# Patient Record
Sex: MALE | Race: WHITE | NOT HISPANIC OR LATINO | Employment: UNEMPLOYED | URBAN - NONMETROPOLITAN AREA
[De-identification: names, ages, dates, MRNs, and addresses within clinical notes are randomized per-mention and may not be internally consistent; named-entity substitution may affect disease eponyms.]

---

## 2017-06-27 ENCOUNTER — HOSPITAL ENCOUNTER (OUTPATIENT)
Dept: CT IMAGING | Facility: HOSPITAL | Age: 32
Discharge: HOME | End: 2017-06-27
Admitting: UROLOGY

## 2017-06-27 DIAGNOSIS — R31.9 HEMATURIA SYNDROME: ICD-10-CM

## 2017-06-27 PROCEDURE — 0 IOPAMIDOL 61 % SOLUTION: Performed by: UROLOGY

## 2017-06-27 PROCEDURE — 74177 CT ABD & PELVIS W/CONTRAST: CPT

## 2017-06-27 RX ADMIN — IOPAMIDOL 95 ML: 612 INJECTION, SOLUTION INTRAVENOUS at 09:30

## 2022-09-26 ENCOUNTER — TELEPHONE (OUTPATIENT)
Dept: UROLOGY | Age: 37
End: 2022-09-26

## 2022-09-26 NOTE — TELEPHONE ENCOUNTER
Looking at referral says he had a ct, us, and labs done would recommend getting this results sent from pcp or julius done and send to us for review once obtained to know how to schedule, looking through records I do not see those results. Would go ahead and make sure he knows to get disc with imaging for when we do schedule new pt apt.

## 2022-10-12 ENCOUNTER — OFFICE VISIT (OUTPATIENT)
Dept: UROLOGY | Age: 37
End: 2022-10-12
Payer: COMMERCIAL

## 2022-10-12 VITALS
WEIGHT: 312.2 LBS | TEMPERATURE: 98.2 F | DIASTOLIC BLOOD PRESSURE: 80 MMHG | SYSTOLIC BLOOD PRESSURE: 148 MMHG | HEIGHT: 72 IN | BODY MASS INDEX: 42.29 KG/M2

## 2022-10-12 DIAGNOSIS — N50.811 PAIN IN BOTH TESTICLES: ICD-10-CM

## 2022-10-12 DIAGNOSIS — R31.0 GROSS HEMATURIA: Primary | ICD-10-CM

## 2022-10-12 DIAGNOSIS — R79.89 LOW TESTOSTERONE: ICD-10-CM

## 2022-10-12 DIAGNOSIS — Z87.442 HISTORY OF KIDNEY STONES: ICD-10-CM

## 2022-10-12 DIAGNOSIS — N50.812 PAIN IN BOTH TESTICLES: ICD-10-CM

## 2022-10-12 DIAGNOSIS — N40.1 BPH WITH OBSTRUCTION/LOWER URINARY TRACT SYMPTOMS: ICD-10-CM

## 2022-10-12 DIAGNOSIS — N13.8 BPH WITH OBSTRUCTION/LOWER URINARY TRACT SYMPTOMS: ICD-10-CM

## 2022-10-12 LAB
ALBUMIN SERPL-MCNC: 4.7 G/DL (ref 3.5–5.2)
ALP BLD-CCNC: 79 U/L (ref 40–130)
ALT SERPL-CCNC: 38 U/L (ref 5–41)
ANION GAP SERPL CALCULATED.3IONS-SCNC: 9 MMOL/L (ref 7–19)
AST SERPL-CCNC: 16 U/L (ref 5–40)
BACTERIA URINE, POC: 0
BILIRUB SERPL-MCNC: 0.5 MG/DL (ref 0.2–1.2)
BILIRUBIN URINE: 0 MG/DL
BLOOD, URINE: POSITIVE
BUN BLDV-MCNC: 12 MG/DL (ref 6–20)
CALCIUM SERPL-MCNC: 9.1 MG/DL (ref 8.6–10)
CASTS URINE, POC: 0
CHLORIDE BLD-SCNC: 100 MMOL/L (ref 98–111)
CHOLESTEROL, TOTAL: 185 MG/DL (ref 160–199)
CLARITY: CLEAR
CO2: 29 MMOL/L (ref 22–29)
COLOR: YELLOW
CREAT SERPL-MCNC: 0.8 MG/DL (ref 0.5–1.2)
CRYSTALS URINE, POC: 0
EPI CELLS URINE, POC: 0
FOLLICLE STIMULATING HORMONE: 4.7 MIU/ML
GFR AFRICAN AMERICAN: >59
GFR NON-AFRICAN AMERICAN: >60
GLUCOSE BLD-MCNC: 84 MG/DL (ref 74–109)
GLUCOSE URINE: NORMAL
HCT VFR BLD CALC: 45.2 % (ref 42–52)
HDLC SERPL-MCNC: 31 MG/DL (ref 55–121)
HEMOGLOBIN: 15.1 G/DL (ref 14–18)
KETONES, URINE: NEGATIVE
LDL CHOLESTEROL CALCULATED: 118 MG/DL
LEUKOCYTE EST, POC: NORMAL
LUTEINIZING HORMONE: 5.1 MIU/ML
MCH RBC QN AUTO: 30.9 PG (ref 27–31)
MCHC RBC AUTO-ENTMCNC: 33.4 G/DL (ref 33–37)
MCV RBC AUTO: 92.4 FL (ref 80–94)
NITRITE, URINE: NEGATIVE
PDW BLD-RTO: 13.1 % (ref 11.5–14.5)
PH UA: 6.5 (ref 4.5–8)
PLATELET # BLD: 265 K/UL (ref 130–400)
PMV BLD AUTO: 10 FL (ref 9.4–12.4)
POTASSIUM SERPL-SCNC: 4.1 MMOL/L (ref 3.5–5)
PROLACTIN: 6.73 NG/ML (ref 4.04–15.2)
PROTEIN UA: NEGATIVE
RBC # BLD: 4.89 M/UL (ref 4.7–6.1)
RBC URINE, POC: 3
SODIUM BLD-SCNC: 138 MMOL/L (ref 136–145)
SPECIFIC GRAVITY UA: 1.02 (ref 1–1.03)
TOTAL PROTEIN: 7.3 G/DL (ref 6.6–8.7)
TRIGL SERPL-MCNC: 179 MG/DL (ref 0–149)
UROBILINOGEN, URINE: NORMAL
WBC # BLD: 10.5 K/UL (ref 4.8–10.8)
WBC URINE, POC: 0
YEAST URINE, POC: 0

## 2022-10-12 PROCEDURE — 81001 URINALYSIS AUTO W/SCOPE: CPT | Performed by: NURSE PRACTITIONER

## 2022-10-12 PROCEDURE — G2212 PROLONG OUTPT/OFFICE VIS: HCPCS | Performed by: NURSE PRACTITIONER

## 2022-10-12 PROCEDURE — 99205 OFFICE O/P NEW HI 60 MIN: CPT | Performed by: NURSE PRACTITIONER

## 2022-10-12 PROCEDURE — 51798 US URINE CAPACITY MEASURE: CPT | Performed by: NURSE PRACTITIONER

## 2022-10-12 RX ORDER — OXYBUTYNIN CHLORIDE 10 MG/1
10 TABLET, EXTENDED RELEASE ORAL DAILY
Qty: 90 TABLET | Refills: 1 | Status: SHIPPED | OUTPATIENT
Start: 2022-10-12

## 2022-10-12 RX ORDER — MAGNESIUM 200 MG
1 TABLET ORAL DAILY
COMMUNITY
Start: 2022-10-10

## 2022-10-12 RX ORDER — PANTOPRAZOLE SODIUM 40 MG/1
40 TABLET, DELAYED RELEASE ORAL NIGHTLY
COMMUNITY
Start: 2022-07-28

## 2022-10-12 RX ORDER — LISINOPRIL 40 MG/1
40 TABLET ORAL NIGHTLY
COMMUNITY
Start: 2022-09-01

## 2022-10-12 RX ORDER — TRAZODONE HYDROCHLORIDE 50 MG/1
50 TABLET ORAL NIGHTLY
COMMUNITY
Start: 2022-09-21

## 2022-10-12 RX ORDER — IBUPROFEN 800 MG/1
800 TABLET ORAL EVERY 6 HOURS PRN
COMMUNITY
Start: 2022-10-07

## 2022-10-12 RX ORDER — ERGOCALCIFEROL 1.25 MG/1
50000 CAPSULE ORAL WEEKLY
COMMUNITY
Start: 2022-10-09

## 2022-10-12 RX ORDER — TAMSULOSIN HYDROCHLORIDE 0.4 MG/1
0.8 CAPSULE ORAL NIGHTLY
COMMUNITY
Start: 2022-09-22

## 2022-10-12 RX ORDER — ALLOPURINOL 100 MG/1
100 TABLET ORAL DAILY
COMMUNITY
Start: 2022-09-16

## 2022-10-12 NOTE — PROGRESS NOTES
Janet Tfaoya is a 30 y.o., male, New patient who presents today   Chief Complaint   Patient presents with    New Patient     I am here today as a new pt being seen for  urinary issues. HPI   Patient presents for evaluation of several urologic issues. The first issue is gross hematuria. Patient reports about 2 weeks ago experiencing a significant episode of gross hematuria with clots. He reports associated symptoms of pain in his suprapubic area shooting into his right testicle as well as significant dysuria testicular swelling which by description sounds like an episode of epididymoorchitis. He was treated with ciprofloxacin for 10 days as well as Pyridium and reports that his testicular swelling and pain greatly improved as well as the hematuria and dysuria. Report occasionally experiencing a pain that \"goes from his knee all the way up to his groin. This was his first episode of gross hematuria, but reports that he has apparently had microscopic hematuria for over 5 years. He has previously been evaluated in [facility] by a urologist with cystoscopy but no upper tract imaging. Cannot recall the results of the cystoscopy. He reports that the cystoscopy was very traumatic for him and he would not be able to undergo another exam without anesthesia. He has previously worked as a  for about 15 years and has been exposed to various chemicals. He has never been a smoker, but has dipped for several years. He does have a familial history of testicular cancer on his mother side but no history of prostate, bladder, kidney cancer. Is already been evaluated with renal ultrasound which did not reveal any abnormalities as well as scrotal ultrasound which did reveal a couple of moderately sized calcifications, 1 in each testicle. I did review these outside images on a disc today.     Also has some chronic lower urinary tract symptoms including incomplete emptying, frequency, intermittency, weak stream, straining, nocturia x5 or more. He has an AUA symptom score of 20/35 with a bother score of 5. He has maintained on Flomax which he reports does alleviate his symptoms mildly. Patient also reports a history of nephrolithiasis. His first stone episode occurred when he was 14years old. He reports passing at least 1 stone per year. His last stone episode was about a year ago with a very \"spiky enlarged\" stone. Patient has always passed a stone spontaneously and has never required surgery. He has never had a metabolic work-up, but is interested in pursuing this. He is currently taking vitamin D, but has only been on this medication for a month. He does have a strong family history of stones. He does not know stone composition. Patient also has low testosterone which was recently checked by his primary care provider secondary to increased fatigue. Patient also experiences weakness, mood swings, lack of energy, some mild erectile dysfunction. His symptoms have been ongoing for some time. He is maintained on Zoloft, but reports his symptoms were present prior to starting the medication. His wife also reports some premature ejaculation. He has no issues with low libido. Testosterone from his primary care provider was low at 120. His PSA was 0.4. He is interested in pursuing therapy. REVIEW OF SYSTEMS:  Review of Systems   Constitutional:  Positive for fatigue. Negative for chills and fever. Gastrointestinal:  Negative for abdominal distention, abdominal pain, nausea and vomiting. Genitourinary:  Positive for difficulty urinating, dysuria, frequency, hematuria (gross hematuria about 2 weeks ago. saw clots.), testicular pain (bilateral) and urgency. Negative for flank pain. Musculoskeletal:  Negative for back pain and gait problem. Neurological:  Positive for weakness. Psychiatric/Behavioral:  Negative for agitation and confusion.       Past Medical History:   Diagnosis Date Hypertension     Kidney stone        History reviewed. No pertinent surgical history. Current Outpatient Medications   Medication Sig Dispense Refill    allopurinol (ZYLOPRIM) 100 MG tablet TAKE 1 TABLET BY MOUTH EVERY DAY      Cyanocobalamin (VITAMIN B-12) 1000 MCG SUBL       vitamin D (ERGOCALCIFEROL) 1.25 MG (38253 UT) CAPS capsule       ibuprofen (ADVIL;MOTRIN) 800 MG tablet       lisinopril (PRINIVIL;ZESTRIL) 40 MG tablet TAKE 1 TABLET BY MOUTH EVERY DAY      pantoprazole (PROTONIX) 40 MG tablet TAKE 1 TABLET BY MOUTH EVERY DAY      sertraline (ZOLOFT) 50 MG tablet TAKE 1 TABLET BY MOUTH EVERY DAY      tamsulosin (FLOMAX) 0.4 MG capsule TAKE 1 CAPSULE BY MOUTH TWICE A DAY      traZODone (DESYREL) 50 MG tablet TAKE 1 TABLET BY MOUTH AT BEDTIME      oxybutynin (DITROPAN XL) 10 MG extended release tablet Take 1 tablet by mouth daily 90 tablet 1     No current facility-administered medications for this visit. No Known Allergies    Social History     Socioeconomic History    Marital status:      Spouse name: None    Number of children: None    Years of education: None    Highest education level: None   Tobacco Use    Smoking status: Never    Smokeless tobacco: Never       Family History   Problem Relation Age of Onset    Cancer Paternal Uncle        PHYSICAL EXAM:  BP (!) 148/80   Temp 98.2 °F (36.8 °C)   Ht 6' (1.829 m)   Wt (!) 312 lb 3.2 oz (141.6 kg)   BMI 42.34 kg/m²   Physical Exam  Vitals and nursing note reviewed. Constitutional:       General: He is not in acute distress. Appearance: Normal appearance. He is not ill-appearing. Pulmonary:      Effort: Pulmonary effort is normal. No respiratory distress. Abdominal:      General: There is no distension. Tenderness: There is no abdominal tenderness. There is no right CVA tenderness or left CVA tenderness. Genitourinary:     Testes:         Right: Mass or swelling not present. Left: Mass or swelling not present. Prostate: Enlarged (60-60g- hard to completely feel). No nodules present. Neurological:      Mental Status: He is alert and oriented to person, place, and time. Mental status is at baseline. Psychiatric:         Mood and Affect: Mood normal.         Behavior: Behavior normal.       DATA:  Results for orders placed or performed in visit on 10/12/22   Culture, Urine    Specimen: Urine, clean catch   Result Value Ref Range    Urine Culture, Routine No growth at 36-48 hours    POCT Urinalysis Dipstick w/ Micro (Auto)   Result Value Ref Range    Color, UA Yellow     Clarity, UA Clear Clear    Glucose, Ur neg     Bilirubin Urine 0 mg/dL    Ketones, Urine Negative     Specific Gravity, UA 1.020 1.005 - 1.030    Blood, Urine Positive     pH, UA 6.5 4.5 - 8.0    Protein, UA Negative Negative    Nitrite, Urine Negative     Leukocytes, UA neg     Urobilinogen, Urine Normal     RBC Urine, POC 3     WBC Urine, POC 0     Bacteria Urine, POC 0     yeast urine, poc 0     Casts Urine, POC 0     Epi Cells Urine, POC 0     crystals urine, poc 0        ASSESSMENT/PLAN  1. Gross hematuria  Hematuria is explained to the patient as blood in the urine, gross (visible to the naked eye) or microscopic. In many patients, the condition is benign and of no consequence. However, it may be related to an underlying cause including but not limited to malignancies or infections of the urinary system, renal mass, nephrolithiasis, or BPH. Evaluation options have been discussed and explained to patient. Taking into account his medical, family, and social history, this patient falls into a high category. We will proceed with cystoscopy and CT Urogram.  Cystoscopy will be performed under anesthesia per patient request.     - CT ABDOMEN PELVIS W WO CONTRAST Additional Contrast? Radiologist Recommendation (Urogram Protocol); Future    2. BPH with obstruction/lower urinary tract symptoms  Patient with significant lower urinary tract symptoms.   He is already maintained on Flomax, but continues to complain mostly of irritative symptoms. Will initiate anticholinergic therapy and follow-up in about 3 months with bladder scan and to evaluate symptoms. ARI revealed enlarged nonsuspicious prostate  - MT Measure, post-void residual, US, non-imaging  - POCT Urinalysis Dipstick w/ Micro (Auto)  - oxybutynin (DITROPAN XL) 10 MG extended release tablet; Take 1 tablet by mouth daily  Dispense: 90 tablet; Refill: 1  - Culture, Urine    3. Pain in both testicles  Patient continues to have intermittent pain in bilateral testicles. Exam was unremarkable. - MT Measure, post-void residual, US, non-imaging  - POCT Urinalysis Dipstick w/ Micro (Auto)    4. Low testosterone  New labs drawn for testosterone work-up. Estrogen level as well as testosterone free and total continue to be pending at this time, however, all other labs within normal limits. Patient is interested in pursuing Testopel. He is uncertain whether he will be able to maintain injections at home and he is not a good candidate for topical therapy secondary to small children not house and risk of transfer. - Testosterone Free and Total Male; Future  - Follicle Stimulating Hormone; Future  - Luteinizing Hormone; Future  - Prolactin; Future  - Estrogens, total; Future  - Lipid Panel; Future  - CBC; Future  - Comprehensive Metabolic Panel; Future    5. History of kidney stones  Patient with history of nephrolithiasis. He is currently asymptomatic. We will be performing CT urogram for evaluation of gross hematuria and can assess stone burden at that time. At least 90 minutes were spent on the day of the visit reviewing the patient's past medical records/imaging, speaking face to face with the patient, and charting in the post visit period. Orders Placed This Encounter   Procedures    Culture, Urine     Order Specific Question:   Specify (ex-cath, midstream, cysto, etc)?      Answer:   clean catch    CT ABDOMEN PELVIS W WO CONTRAST Additional Contrast? Radiologist Recommendation (Urogram Protocol)     prior to next visit     Standing Status:   Future     Standing Expiration Date:   10/12/2023     Scheduling Instructions:      CT for Urogram protocol     Order Specific Question:   Additional Contrast?     Answer:   Radiologist Recommendation     Comments:   Urogram Protocol     Order Specific Question:   STAT Creatinine as needed:     Answer:   Yes    Testosterone Free and Total Male     Standing Status:   Future     Number of Occurrences:   1     Standing Expiration Date:   66/16/6448    Follicle Stimulating Hormone     Standing Status:   Future     Number of Occurrences:   1     Standing Expiration Date:   10/12/2023    Luteinizing Hormone     Standing Status:   Future     Number of Occurrences:   1     Standing Expiration Date:   10/12/2023    Prolactin     Standing Status:   Future     Number of Occurrences:   1     Standing Expiration Date:   10/12/2023    Estrogens, total     Standing Status:   Future     Number of Occurrences:   1     Standing Expiration Date:   10/12/2023    Lipid Panel     Standing Status:   Future     Number of Occurrences:   1     Standing Expiration Date:   10/12/2023    CBC     Standing Status:   Future     Number of Occurrences:   1     Standing Expiration Date:   10/12/2023    Comprehensive Metabolic Panel     Standing Status:   Future     Number of Occurrences:   1     Standing Expiration Date:   10/12/2023    POCT Urinalysis Dipstick w/ Micro (Auto)    OH Measure, post-void residual, US, non-imaging     60ml        Return in about 3 months (around 1/12/2023). An electronic signature was used to authenticate this note.     ANGEL BRIAN, APRN - CNP    All information inputted into the note by the MA to include chief complaint, past medical history, past surgical history, medications, allergies, social and family history and review of systems has been reviewed and updated as needed by me.    WAGNER Dragon/transcription disclaimer: Much of this document is electronic transcription/translation of spoken language to printed text. The electronic translation of spoken language may be erroneous or, at times, nonsensical words or phrases may be inadvertently transcribed.  Although I have reviewed the document for such errors, some may still exist.

## 2022-10-12 NOTE — H&P (VIEW-ONLY)
Nayla Rodríguez is a 30 y.o., male, New patient who presents today   Chief Complaint   Patient presents with    New Patient     I am here today as a new pt being seen for  urinary issues. HPI   Patient presents for evaluation of several urologic issues. The first issue is gross hematuria. Patient reports about 2 weeks ago experiencing a significant episode of gross hematuria with clots. He reports associated symptoms of pain in his suprapubic area shooting into his right testicle as well as significant dysuria testicular swelling which by description sounds like an episode of epididymoorchitis. He was treated with ciprofloxacin for 10 days as well as Pyridium and reports that his testicular swelling and pain greatly improved as well as the hematuria and dysuria. Report occasionally experiencing a pain that \"goes from his knee all the way up to his groin. This was his first episode of gross hematuria, but reports that he has apparently had microscopic hematuria for over 5 years. He has previously been evaluated in [facility] by a urologist with cystoscopy but no upper tract imaging. Cannot recall the results of the cystoscopy. He reports that the cystoscopy was very traumatic for him and he would not be able to undergo another exam without anesthesia. He has previously worked as a  for about 15 years and has been exposed to various chemicals. He has never been a smoker, but has dipped for several years. He does have a familial history of testicular cancer on his mother side but no history of prostate, bladder, kidney cancer. Is already been evaluated with renal ultrasound which did not reveal any abnormalities as well as scrotal ultrasound which did reveal a couple of moderately sized calcifications, 1 in each testicle. I did review these outside images on a disc today.     Also has some chronic lower urinary tract symptoms including incomplete emptying, frequency, intermittency, weak stream, straining, nocturia x5 or more. He has an AUA symptom score of 20/35 with a bother score of 5. He has maintained on Flomax which he reports does alleviate his symptoms mildly. Patient also reports a history of nephrolithiasis. His first stone episode occurred when he was 14years old. He reports passing at least 1 stone per year. His last stone episode was about a year ago with a very \"spiky enlarged\" stone. Patient has always passed a stone spontaneously and has never required surgery. He has never had a metabolic work-up, but is interested in pursuing this. He is currently taking vitamin D, but has only been on this medication for a month. He does have a strong family history of stones. He does not know stone composition. Patient also has low testosterone which was recently checked by his primary care provider secondary to increased fatigue. Patient also experiences weakness, mood swings, lack of energy, some mild erectile dysfunction. His symptoms have been ongoing for some time. He is maintained on Zoloft, but reports his symptoms were present prior to starting the medication. His wife also reports some premature ejaculation. He has no issues with low libido. Testosterone from his primary care provider was low at 120. His PSA was 0.4. He is interested in pursuing therapy. REVIEW OF SYSTEMS:  Review of Systems   Constitutional:  Positive for fatigue. Negative for chills and fever. Gastrointestinal:  Negative for abdominal distention, abdominal pain, nausea and vomiting. Genitourinary:  Positive for difficulty urinating, dysuria, frequency, hematuria (gross hematuria about 2 weeks ago. saw clots.), testicular pain (bilateral) and urgency. Negative for flank pain. Musculoskeletal:  Negative for back pain and gait problem. Neurological:  Positive for weakness. Psychiatric/Behavioral:  Negative for agitation and confusion.       Past Medical History:   Diagnosis Date Prostate: Enlarged (60-60g- hard to completely feel). No nodules present. Neurological:      Mental Status: He is alert and oriented to person, place, and time. Mental status is at baseline. Psychiatric:         Mood and Affect: Mood normal.         Behavior: Behavior normal.       DATA:  Results for orders placed or performed in visit on 10/12/22   Culture, Urine    Specimen: Urine, clean catch   Result Value Ref Range    Urine Culture, Routine No growth at 36-48 hours    POCT Urinalysis Dipstick w/ Micro (Auto)   Result Value Ref Range    Color, UA Yellow     Clarity, UA Clear Clear    Glucose, Ur neg     Bilirubin Urine 0 mg/dL    Ketones, Urine Negative     Specific Gravity, UA 1.020 1.005 - 1.030    Blood, Urine Positive     pH, UA 6.5 4.5 - 8.0    Protein, UA Negative Negative    Nitrite, Urine Negative     Leukocytes, UA neg     Urobilinogen, Urine Normal     RBC Urine, POC 3     WBC Urine, POC 0     Bacteria Urine, POC 0     yeast urine, poc 0     Casts Urine, POC 0     Epi Cells Urine, POC 0     crystals urine, poc 0        ASSESSMENT/PLAN  1. Gross hematuria  Hematuria is explained to the patient as blood in the urine, gross (visible to the naked eye) or microscopic. In many patients, the condition is benign and of no consequence. However, it may be related to an underlying cause including but not limited to malignancies or infections of the urinary system, renal mass, nephrolithiasis, or BPH. Evaluation options have been discussed and explained to patient. Taking into account his medical, family, and social history, this patient falls into a high category. We will proceed with cystoscopy and CT Urogram.  Cystoscopy will be performed under anesthesia per patient request.     - CT ABDOMEN PELVIS W WO CONTRAST Additional Contrast? Radiologist Recommendation (Urogram Protocol); Future    2. BPH with obstruction/lower urinary tract symptoms  Patient with significant lower urinary tract symptoms.   He is already maintained on Flomax, but continues to complain mostly of irritative symptoms. Will initiate anticholinergic therapy and follow-up in about 3 months with bladder scan and to evaluate symptoms. ARI revealed enlarged nonsuspicious prostate  - SD Measure, post-void residual, US, non-imaging  - POCT Urinalysis Dipstick w/ Micro (Auto)  - oxybutynin (DITROPAN XL) 10 MG extended release tablet; Take 1 tablet by mouth daily  Dispense: 90 tablet; Refill: 1  - Culture, Urine    3. Pain in both testicles  Patient continues to have intermittent pain in bilateral testicles. Exam was unremarkable. - SD Measure, post-void residual, US, non-imaging  - POCT Urinalysis Dipstick w/ Micro (Auto)    4. Low testosterone  New labs drawn for testosterone work-up. Estrogen level as well as testosterone free and total continue to be pending at this time, however, all other labs within normal limits. Patient is interested in pursuing Testopel. He is uncertain whether he will be able to maintain injections at home and he is not a good candidate for topical therapy secondary to small children not house and risk of transfer. - Testosterone Free and Total Male; Future  - Follicle Stimulating Hormone; Future  - Luteinizing Hormone; Future  - Prolactin; Future  - Estrogens, total; Future  - Lipid Panel; Future  - CBC; Future  - Comprehensive Metabolic Panel; Future    5. History of kidney stones  Patient with history of nephrolithiasis. He is currently asymptomatic. We will be performing CT urogram for evaluation of gross hematuria and can assess stone burden at that time. At least 90 minutes were spent on the day of the visit reviewing the patient's past medical records/imaging, speaking face to face with the patient, and charting in the post visit period. Orders Placed This Encounter   Procedures    Culture, Urine     Order Specific Question:   Specify (ex-cath, midstream, cysto, etc)?      Answer:   clean catch    CT ABDOMEN PELVIS W WO CONTRAST Additional Contrast? Radiologist Recommendation (Urogram Protocol)     prior to next visit     Standing Status:   Future     Standing Expiration Date:   10/12/2023     Scheduling Instructions:      CT for Urogram protocol     Order Specific Question:   Additional Contrast?     Answer:   Radiologist Recommendation     Comments:   Urogram Protocol     Order Specific Question:   STAT Creatinine as needed:     Answer:   Yes    Testosterone Free and Total Male     Standing Status:   Future     Number of Occurrences:   1     Standing Expiration Date:   79/46/5225    Follicle Stimulating Hormone     Standing Status:   Future     Number of Occurrences:   1     Standing Expiration Date:   10/12/2023    Luteinizing Hormone     Standing Status:   Future     Number of Occurrences:   1     Standing Expiration Date:   10/12/2023    Prolactin     Standing Status:   Future     Number of Occurrences:   1     Standing Expiration Date:   10/12/2023    Estrogens, total     Standing Status:   Future     Number of Occurrences:   1     Standing Expiration Date:   10/12/2023    Lipid Panel     Standing Status:   Future     Number of Occurrences:   1     Standing Expiration Date:   10/12/2023    CBC     Standing Status:   Future     Number of Occurrences:   1     Standing Expiration Date:   10/12/2023    Comprehensive Metabolic Panel     Standing Status:   Future     Number of Occurrences:   1     Standing Expiration Date:   10/12/2023    POCT Urinalysis Dipstick w/ Micro (Auto)    NH Measure, post-void residual, US, non-imaging     60ml        Return in about 3 months (around 1/12/2023). An electronic signature was used to authenticate this note.     ANGEL BRIAN, APRN - CNP    All information inputted into the note by the MA to include chief complaint, past medical history, past surgical history, medications, allergies, social and family history and review of systems has been reviewed and updated as needed by me.    WAGNER Dragon/transcription disclaimer: Much of this document is electronic transcription/translation of spoken language to printed text. The electronic translation of spoken language may be erroneous or, at times, nonsensical words or phrases may be inadvertently transcribed.  Although I have reviewed the document for such errors, some may still exist.

## 2022-10-14 LAB
SEX HORMONE BINDING GLOBULIN: 15 NMOL/L (ref 11–80)
TESTOSTERONE FREE-NONMALE: 47.1 PG/ML (ref 47–244)
TESTOSTERONE TOTAL: 171 NG/DL (ref 220–1000)
URINE CULTURE, ROUTINE: NORMAL

## 2022-10-14 ASSESSMENT — ENCOUNTER SYMPTOMS
BACK PAIN: 0
ABDOMINAL DISTENTION: 0
NAUSEA: 0
ABDOMINAL PAIN: 0
VOMITING: 0

## 2022-10-15 LAB — ESTROGEN TOTAL: 135 PG/ML (ref 56–213)

## 2022-10-17 ENCOUNTER — HOSPITAL ENCOUNTER (OUTPATIENT)
Dept: CT IMAGING | Age: 37
Discharge: HOME | End: 2022-10-17
Payer: MEDICAID

## 2022-10-17 ENCOUNTER — HOSPITAL ENCOUNTER (OUTPATIENT)
Dept: PREADMISSION TESTING | Age: 37
Discharge: HOME | End: 2022-10-21
Payer: MEDICAID

## 2022-10-17 VITALS — WEIGHT: 310 LBS | BODY MASS INDEX: 41.99 KG/M2 | HEIGHT: 72 IN

## 2022-10-17 DIAGNOSIS — R31.0 GROSS HEMATURIA: ICD-10-CM

## 2022-10-17 LAB
EKG P AXIS: 43 DEGREES
EKG P-R INTERVAL: 172 MS
EKG Q-T INTERVAL: 394 MS
EKG QRS DURATION: 94 MS
EKG QTC CALCULATION (BAZETT): 386 MS
EKG T AXIS: 20 DEGREES

## 2022-10-17 PROCEDURE — 93010 ELECTROCARDIOGRAM REPORT: CPT | Performed by: INTERNAL MEDICINE

## 2022-10-17 PROCEDURE — 74178 CT ABD&PLV WO CNTR FLWD CNTR: CPT

## 2022-10-17 PROCEDURE — 6360000004 HC RX CONTRAST MEDICATION: Performed by: NURSE PRACTITIONER

## 2022-10-17 PROCEDURE — 93005 ELECTROCARDIOGRAM TRACING: CPT | Performed by: ANESTHESIOLOGY

## 2022-10-17 RX ORDER — LEVOFLOXACIN 5 MG/ML
500 INJECTION, SOLUTION INTRAVENOUS ONCE
Status: CANCELLED | OUTPATIENT
Start: 2022-10-18

## 2022-10-17 RX ADMIN — IOPAMIDOL 75 ML: 755 INJECTION, SOLUTION INTRAVENOUS at 08:58

## 2022-10-17 NOTE — DISCHARGE INSTRUCTIONS
PREOPERATIVE GUIDELINES WHEN RECEIVING ANESTHESIA    Do not eat or drink anything after midnight, the night before your surgery. This is extremely important for your safety. Take a bath (or shower) the night before your surgery and you may brush your teeth the morning of your surgery. You will be scheduled to arrive at the hospital 2 hours before your surgery, or follow your surgeon's instructions. Dress comfortably. Wear loose clothing that will be easy to remove and comfortable for your trip home. You may wear eyeglasses or contacts but bring your cases with you as they must be remove before your surgery. Hearing aids and dentures will need to be removed before your surgery. Do not wear any jewelry, including body jewelry. All jewelry will need to be removed prior to your surgery. Do not wear fingernail polish or make-up. It is best not to bring any valuables with you. If you are to stay in the hospital overnight, bring your robe, slippers and personal toiletries that you may need. POSTOPERATIVE GUIDELINES AFTER RECEIVING ANESTHESIA    If you are to go home after your surgery, you will need a responsible adult to drive you home. You will not be able to take public transportation after your discharge from the Operative Care Unit unless you are accompanied by a        responsible adult. On returning home, be sure to follow your physician's orders regarding diet, activity and medications. Remember, surgery with general anesthesia or sedation may leave you sleepy, very tired and with a decreased appetite for 12 to 24 hours. If you develop any post-surgical complications or problems, call your surgeon or [facility] Emergency Department ([phone]). The day before your surgery, you will receive a phone call from the surgery nurse, to let you know what time to arrive on the day of surgery.   This call will usually be between 2-4 PM.  If you do not receive a phone call by 4 PM the day before your surgery, please call [phone] and let them know you have not received an arrival time. If your surgery is on Monday, your call will be on the Friday before your Monday surgery. MEDICATION INSTRUCTIONS PRIOR TO YOUR SURGERY      The morning of surgery:  do not take lisinopril    You can take all your usual prescribed medications with a small sip of water. DO NOT TAKE ANY DIABETIC MEDICATIONS the morning of your surgery. DO NOT TAKE ANY SUPPLEMENTS or over the counter medications the morning of  surgery. [address] [place] [address] Surgery Patients-Revised 6-    Visitors for surgery patients are essential for the patient's emotional well-being and care       post operatively. 2.   Visitor Expectations and Limitations      3. One visitor allowed with patients in the preop/postop rooms. 4.  A second visitor may sit in the waiting area. 5.  No children under 13 allowed in the pre-post op areas unless they are the patient. 6.  Two people may be with an underage surgical/procedural patient in preop/postop        room. 7.  If you are admitted to the hospital post operatively, there are NO RESTRICTIONS on       the floor at this time. 8.  If you are admitted to ICU postoperatively, you may have one visitor in the room from        7A-7P. A second visitor may sit in the ICU waiting room.   There can be no overnight

## 2022-10-18 ENCOUNTER — ANESTHESIA (OUTPATIENT)
Dept: OPERATING ROOM | Age: 37
End: 2022-10-18
Payer: MEDICAID

## 2022-10-18 ENCOUNTER — ANESTHESIA EVENT (OUTPATIENT)
Dept: OPERATING ROOM | Age: 37
End: 2022-10-18
Payer: MEDICAID

## 2022-10-18 ENCOUNTER — APPOINTMENT (OUTPATIENT)
Dept: GENERAL RADIOLOGY | Age: 37
End: 2022-10-18
Attending: UROLOGY
Payer: MEDICAID

## 2022-10-18 ENCOUNTER — HOSPITAL ENCOUNTER (OUTPATIENT)
Age: 37
Setting detail: OUTPATIENT SURGERY
Discharge: HOME | End: 2022-10-18
Attending: UROLOGY | Admitting: UROLOGY
Payer: MEDICAID

## 2022-10-18 VITALS
HEIGHT: 72 IN | HEART RATE: 64 BPM | BODY MASS INDEX: 41.99 KG/M2 | OXYGEN SATURATION: 97 % | TEMPERATURE: 97.2 F | RESPIRATION RATE: 16 BRPM | DIASTOLIC BLOOD PRESSURE: 81 MMHG | WEIGHT: 310 LBS | SYSTOLIC BLOOD PRESSURE: 141 MMHG

## 2022-10-18 DIAGNOSIS — N20.0 KIDNEY STONES: ICD-10-CM

## 2022-10-18 DIAGNOSIS — N35.914 ANTERIOR URETHRAL STRICTURE: Primary | ICD-10-CM

## 2022-10-18 PROBLEM — R31.0 GROSS HEMATURIA: Status: ACTIVE | Noted: 2022-10-18

## 2022-10-18 PROCEDURE — 6370000000 HC RX 637 (ALT 250 FOR IP): Performed by: ANESTHESIOLOGY

## 2022-10-18 PROCEDURE — 7100000011 HC PHASE II RECOVERY - ADDTL 15 MIN: Performed by: UROLOGY

## 2022-10-18 PROCEDURE — 3700000001 HC ADD 15 MINUTES (ANESTHESIA): Performed by: UROLOGY

## 2022-10-18 PROCEDURE — 52281 CYSTOSCOPY AND TREATMENT: CPT | Performed by: UROLOGY

## 2022-10-18 PROCEDURE — 3209999900 FLUORO FOR SURGICAL PROCEDURES

## 2022-10-18 PROCEDURE — 6360000002 HC RX W HCPCS

## 2022-10-18 PROCEDURE — 7100000000 HC PACU RECOVERY - FIRST 15 MIN: Performed by: UROLOGY

## 2022-10-18 PROCEDURE — 7100000010 HC PHASE II RECOVERY - FIRST 15 MIN: Performed by: UROLOGY

## 2022-10-18 PROCEDURE — 6360000002 HC RX W HCPCS: Performed by: NURSE PRACTITIONER

## 2022-10-18 PROCEDURE — C1758 CATHETER, URETERAL: HCPCS | Performed by: UROLOGY

## 2022-10-18 PROCEDURE — 7100000001 HC PACU RECOVERY - ADDTL 15 MIN: Performed by: UROLOGY

## 2022-10-18 PROCEDURE — 3700000000 HC ANESTHESIA ATTENDED CARE: Performed by: UROLOGY

## 2022-10-18 PROCEDURE — 6360000002 HC RX W HCPCS: Performed by: ANESTHESIOLOGY

## 2022-10-18 PROCEDURE — C1726 CATH, BAL DIL, NON-VASCULAR: HCPCS | Performed by: UROLOGY

## 2022-10-18 PROCEDURE — C1769 GUIDE WIRE: HCPCS | Performed by: UROLOGY

## 2022-10-18 PROCEDURE — 2500000003 HC RX 250 WO HCPCS

## 2022-10-18 PROCEDURE — 2709999900 HC NON-CHARGEABLE SUPPLY: Performed by: UROLOGY

## 2022-10-18 PROCEDURE — 3600000004 HC SURGERY LEVEL 4 BASE: Performed by: UROLOGY

## 2022-10-18 PROCEDURE — 2580000003 HC RX 258: Performed by: ANESTHESIOLOGY

## 2022-10-18 PROCEDURE — 3600000014 HC SURGERY LEVEL 4 ADDTL 15MIN: Performed by: UROLOGY

## 2022-10-18 RX ORDER — SODIUM CHLORIDE 0.9 % (FLUSH) 0.9 %
5-40 SYRINGE (ML) INJECTION PRN
Status: DISCONTINUED | OUTPATIENT
Start: 2022-10-18 | End: 2022-10-18 | Stop reason: HOSPADM

## 2022-10-18 RX ORDER — FAMOTIDINE 20 MG/1
20 TABLET, FILM COATED ORAL ONCE
Status: COMPLETED | OUTPATIENT
Start: 2022-10-18 | End: 2022-10-18

## 2022-10-18 RX ORDER — SODIUM CHLORIDE 9 MG/ML
INJECTION, SOLUTION INTRAVENOUS PRN
Status: DISCONTINUED | OUTPATIENT
Start: 2022-10-18 | End: 2022-10-18 | Stop reason: HOSPADM

## 2022-10-18 RX ORDER — FENTANYL CITRATE 50 UG/ML
25 INJECTION, SOLUTION INTRAMUSCULAR; INTRAVENOUS EVERY 5 MIN PRN
Status: DISCONTINUED | OUTPATIENT
Start: 2022-10-18 | End: 2022-10-18

## 2022-10-18 RX ORDER — MIDAZOLAM HYDROCHLORIDE 1 MG/ML
INJECTION INTRAMUSCULAR; INTRAVENOUS PRN
Status: DISCONTINUED | OUTPATIENT
Start: 2022-10-18 | End: 2022-10-18 | Stop reason: SDUPTHER

## 2022-10-18 RX ORDER — FENTANYL CITRATE 50 UG/ML
25 INJECTION, SOLUTION INTRAMUSCULAR; INTRAVENOUS
Status: DISCONTINUED | OUTPATIENT
Start: 2022-10-18 | End: 2022-10-18 | Stop reason: HOSPADM

## 2022-10-18 RX ORDER — DEXAMETHASONE 4 MG/1
4 TABLET ORAL ONCE
Status: COMPLETED | OUTPATIENT
Start: 2022-10-18 | End: 2022-10-18

## 2022-10-18 RX ORDER — EPHEDRINE SULFATE 50 MG/ML
INJECTION, SOLUTION INTRAVENOUS PRN
Status: DISCONTINUED | OUTPATIENT
Start: 2022-10-18 | End: 2022-10-18 | Stop reason: SDUPTHER

## 2022-10-18 RX ORDER — LIDOCAINE HYDROCHLORIDE 10 MG/ML
1 INJECTION, SOLUTION EPIDURAL; INFILTRATION; INTRACAUDAL; PERINEURAL
Status: DISCONTINUED | OUTPATIENT
Start: 2022-10-18 | End: 2022-10-18 | Stop reason: HOSPADM

## 2022-10-18 RX ORDER — LIDOCAINE HYDROCHLORIDE 10 MG/ML
INJECTION, SOLUTION EPIDURAL; INFILTRATION; INTRACAUDAL; PERINEURAL PRN
Status: DISCONTINUED | OUTPATIENT
Start: 2022-10-18 | End: 2022-10-18 | Stop reason: SDUPTHER

## 2022-10-18 RX ORDER — HYDROMORPHONE HYDROCHLORIDE 1 MG/ML
0.5 INJECTION, SOLUTION INTRAMUSCULAR; INTRAVENOUS; SUBCUTANEOUS EVERY 5 MIN PRN
Status: DISCONTINUED | OUTPATIENT
Start: 2022-10-18 | End: 2022-10-18 | Stop reason: HOSPADM

## 2022-10-18 RX ORDER — OXYCODONE HYDROCHLORIDE AND ACETAMINOPHEN 5; 325 MG/1; MG/1
2 TABLET ORAL EVERY 4 HOURS PRN
Status: DISCONTINUED | OUTPATIENT
Start: 2022-10-18 | End: 2022-10-18 | Stop reason: HOSPADM

## 2022-10-18 RX ORDER — HYDROCODONE BITARTRATE AND ACETAMINOPHEN 5; 325 MG/1; MG/1
1 TABLET ORAL EVERY 6 HOURS PRN
Qty: 12 TABLET | Refills: 0 | Status: SHIPPED | OUTPATIENT
Start: 2022-10-18 | End: 2022-10-21

## 2022-10-18 RX ORDER — FENTANYL CITRATE 50 UG/ML
INJECTION, SOLUTION INTRAMUSCULAR; INTRAVENOUS PRN
Status: DISCONTINUED | OUTPATIENT
Start: 2022-10-18 | End: 2022-10-18 | Stop reason: SDUPTHER

## 2022-10-18 RX ORDER — PHENAZOPYRIDINE HYDROCHLORIDE 100 MG/1
100 TABLET, FILM COATED ORAL 3 TIMES DAILY PRN
Qty: 21 TABLET | Refills: 1 | Status: SHIPPED | OUTPATIENT
Start: 2022-10-18 | End: 2022-10-25

## 2022-10-18 RX ORDER — PROPOFOL 10 MG/ML
INJECTION, EMULSION INTRAVENOUS PRN
Status: DISCONTINUED | OUTPATIENT
Start: 2022-10-18 | End: 2022-10-18 | Stop reason: SDUPTHER

## 2022-10-18 RX ORDER — SODIUM CHLORIDE 0.9 % (FLUSH) 0.9 %
5-40 SYRINGE (ML) INJECTION EVERY 12 HOURS SCHEDULED
Status: DISCONTINUED | OUTPATIENT
Start: 2022-10-18 | End: 2022-10-18 | Stop reason: HOSPADM

## 2022-10-18 RX ORDER — FENTANYL CITRATE 50 UG/ML
50 INJECTION, SOLUTION INTRAMUSCULAR; INTRAVENOUS EVERY 5 MIN PRN
Status: DISCONTINUED | OUTPATIENT
Start: 2022-10-18 | End: 2022-10-18

## 2022-10-18 RX ORDER — MEPERIDINE HYDROCHLORIDE 25 MG/ML
12.5 INJECTION INTRAMUSCULAR; INTRAVENOUS; SUBCUTANEOUS EVERY 5 MIN PRN
Status: DISCONTINUED | OUTPATIENT
Start: 2022-10-18 | End: 2022-10-18 | Stop reason: HOSPADM

## 2022-10-18 RX ORDER — MIDAZOLAM HYDROCHLORIDE 2 MG/2ML
2 INJECTION, SOLUTION INTRAMUSCULAR; INTRAVENOUS
Status: DISCONTINUED | OUTPATIENT
Start: 2022-10-18 | End: 2022-10-18 | Stop reason: HOSPADM

## 2022-10-18 RX ORDER — ONDANSETRON 2 MG/ML
4 INJECTION INTRAMUSCULAR; INTRAVENOUS
Status: DISCONTINUED | OUTPATIENT
Start: 2022-10-18 | End: 2022-10-18 | Stop reason: HOSPADM

## 2022-10-18 RX ORDER — HYDROMORPHONE HYDROCHLORIDE 1 MG/ML
0.5 INJECTION, SOLUTION INTRAMUSCULAR; INTRAVENOUS; SUBCUTANEOUS
Status: DISCONTINUED | OUTPATIENT
Start: 2022-10-18 | End: 2022-10-18 | Stop reason: HOSPADM

## 2022-10-18 RX ORDER — FENTANYL CITRATE 50 UG/ML
50 INJECTION, SOLUTION INTRAMUSCULAR; INTRAVENOUS
Status: DISCONTINUED | OUTPATIENT
Start: 2022-10-18 | End: 2022-10-18 | Stop reason: HOSPADM

## 2022-10-18 RX ORDER — DEXAMETHASONE SODIUM PHOSPHATE 10 MG/ML
INJECTION, SOLUTION INTRAMUSCULAR; INTRAVENOUS PRN
Status: DISCONTINUED | OUTPATIENT
Start: 2022-10-18 | End: 2022-10-18 | Stop reason: SDUPTHER

## 2022-10-18 RX ORDER — SODIUM CHLORIDE 9 MG/ML
INJECTION, SOLUTION INTRAVENOUS CONTINUOUS
Status: DISCONTINUED | OUTPATIENT
Start: 2022-10-18 | End: 2022-10-18 | Stop reason: HOSPADM

## 2022-10-18 RX ORDER — HYDROMORPHONE HYDROCHLORIDE 1 MG/ML
0.25 INJECTION, SOLUTION INTRAMUSCULAR; INTRAVENOUS; SUBCUTANEOUS EVERY 5 MIN PRN
Status: DISCONTINUED | OUTPATIENT
Start: 2022-10-18 | End: 2022-10-18 | Stop reason: HOSPADM

## 2022-10-18 RX ORDER — CIPROFLOXACIN 500 MG/1
500 TABLET, FILM COATED ORAL DAILY
Qty: 7 TABLET | Refills: 0 | Status: SHIPPED | OUTPATIENT
Start: 2022-10-18 | End: 2022-10-25

## 2022-10-18 RX ORDER — PROCHLORPERAZINE EDISYLATE 5 MG/ML
5 INJECTION INTRAMUSCULAR; INTRAVENOUS
Status: DISCONTINUED | OUTPATIENT
Start: 2022-10-18 | End: 2022-10-18 | Stop reason: HOSPADM

## 2022-10-18 RX ORDER — DIPHENHYDRAMINE HYDROCHLORIDE 50 MG/ML
12.5 INJECTION INTRAMUSCULAR; INTRAVENOUS
Status: DISCONTINUED | OUTPATIENT
Start: 2022-10-18 | End: 2022-10-18 | Stop reason: HOSPADM

## 2022-10-18 RX ORDER — LEVOFLOXACIN 5 MG/ML
500 INJECTION, SOLUTION INTRAVENOUS ONCE
Status: COMPLETED | OUTPATIENT
Start: 2022-10-18 | End: 2022-10-18

## 2022-10-18 RX ORDER — ONDANSETRON 2 MG/ML
INJECTION INTRAMUSCULAR; INTRAVENOUS PRN
Status: DISCONTINUED | OUTPATIENT
Start: 2022-10-18 | End: 2022-10-18 | Stop reason: SDUPTHER

## 2022-10-18 RX ORDER — SODIUM CHLORIDE, SODIUM LACTATE, POTASSIUM CHLORIDE, CALCIUM CHLORIDE 600; 310; 30; 20 MG/100ML; MG/100ML; MG/100ML; MG/100ML
INJECTION, SOLUTION INTRAVENOUS CONTINUOUS
Status: DISCONTINUED | OUTPATIENT
Start: 2022-10-18 | End: 2022-10-18 | Stop reason: HOSPADM

## 2022-10-18 RX ADMIN — EPHEDRINE SULFATE 10 MG: 50 INJECTION INTRAMUSCULAR; INTRAVENOUS; SUBCUTANEOUS at 10:33

## 2022-10-18 RX ADMIN — DEXAMETHASONE 4 MG: 4 TABLET ORAL at 08:39

## 2022-10-18 RX ADMIN — ONDANSETRON 4 MG: 2 INJECTION INTRAMUSCULAR; INTRAVENOUS at 10:31

## 2022-10-18 RX ADMIN — FAMOTIDINE 20 MG: 20 TABLET ORAL at 08:39

## 2022-10-18 RX ADMIN — PROPOFOL 150 MG: 10 INJECTION, EMULSION INTRAVENOUS at 10:12

## 2022-10-18 RX ADMIN — LEVOFLOXACIN 500 MG: 5 INJECTION, SOLUTION INTRAVENOUS at 10:08

## 2022-10-18 RX ADMIN — LIDOCAINE HYDROCHLORIDE 50 MG: 10 INJECTION, SOLUTION EPIDURAL; INFILTRATION; INTRACAUDAL; PERINEURAL at 10:12

## 2022-10-18 RX ADMIN — MIDAZOLAM 2 MG: 1 INJECTION INTRAMUSCULAR; INTRAVENOUS at 10:06

## 2022-10-18 RX ADMIN — EPHEDRINE SULFATE 10 MG: 50 INJECTION INTRAMUSCULAR; INTRAVENOUS; SUBCUTANEOUS at 10:39

## 2022-10-18 RX ADMIN — SODIUM CHLORIDE, POTASSIUM CHLORIDE, SODIUM LACTATE AND CALCIUM CHLORIDE: 600; 310; 30; 20 INJECTION, SOLUTION INTRAVENOUS at 08:39

## 2022-10-18 RX ADMIN — DEXAMETHASONE SODIUM PHOSPHATE 10 MG: 10 INJECTION, SOLUTION INTRAMUSCULAR; INTRAVENOUS at 10:31

## 2022-10-18 RX ADMIN — FENTANYL CITRATE 100 MCG: 50 INJECTION, SOLUTION INTRAMUSCULAR; INTRAVENOUS at 10:12

## 2022-10-18 ASSESSMENT — ENCOUNTER SYMPTOMS: SHORTNESS OF BREATH: 0

## 2022-10-18 ASSESSMENT — PAIN - FUNCTIONAL ASSESSMENT: PAIN_FUNCTIONAL_ASSESSMENT: NONE - DENIES PAIN

## 2022-10-18 ASSESSMENT — LIFESTYLE VARIABLES: SMOKING_STATUS: 0

## 2022-10-18 NOTE — DISCHARGE INSTRUCTIONS
[facility]rology, Dr Manuel Nichols    Cystoscopy / Ureteroscopy / Bladder Endoscopy Procedures Discharge Instructions      You may experience : Burning sensation when you void     A feeling of a need to go to the bathroom frequently     You may have urgent urination     Your urine may be blood tinged    These symptoms should be relieved within a few hours and days  as you increase the amounts of fluids you drink and the number of times that you empty your bladder. They may persist for 1-2 weeks if you have a stent or had a biopsy or resection. We recommended that you drink plenty of fluid a few days after surgery. If you have a ureteral stent he may have pain in your side or back related to the stent. Stents also cause bladder irritation symptoms of frequency and urgency. No strenuous activities for 1 week or  until you talk to your doctor. You may feel light headed up to 24 hours after anesthesia. You should not do the following for the next 24 hours. Drive a car, operate machinery or power tools     Drink any alcoholic drinks (not even beer or wine)     Make any important decisions, i.e., signing important papers. It is recommended that you begin with clear liquids and/or light foods. If you  Are not nauseated, progress to your normal diet. I you are unable to urinate you should call your surgeon.     Dr. Liz Khan

## 2022-10-18 NOTE — PROGRESS NOTES
CLINICAL PHARMACY NOTE: MEDS TO BEDS    Total # of Prescriptions Filled: 3   The following medications were delivered to the patient:  Ciprofloxacin 500mg  Phenazopyridine 100 mg  Hydrocodone-APAP 5-325    Additional Documentation:   Delivered Rx's to op-care. Gave Rx's to patients caretaker Daryl. Patient paid $0.00 copay.

## 2022-10-18 NOTE — ANESTHESIA POSTPROCEDURE EVALUATION
Department of Anesthesiology  Postprocedure Note    Patient: Melina Arora  MRN: [id number]  YOB: 1985  Date of evaluation: 10/18/2022      Procedure Summary     Date: 10/18/22 Room / Location: [facility]    Anesthesia Start: 1006 Anesthesia Stop:     Procedure: Cystoscopy, Uretheral Dialation, Catheter insertion Diagnosis:       Gross hematuria      (Gross hematuria [R31.0])    Surgeons: Adelita Trivedi MD Responsible Provider: MARINE Mcmillan CRNA    Anesthesia Type: general ASA Status: 3          Anesthesia Type: No value filed.     Anita Phase I: Anita Score: 5    Anita Phase II:        Anesthesia Post Evaluation    Patient location during evaluation: PACU  Patient participation: waiting for patient participation  Level of consciousness: responsive to painful stimuli  Pain score: 0  Airway patency: patent  Nausea & Vomiting: no nausea and no vomiting  Complications: no  Cardiovascular status: hemodynamically stable  Respiratory status: face mask, spontaneous ventilation and oral airway  Hydration status: euvolemic  Multimodal analgesia pain management approach

## 2022-10-18 NOTE — ANESTHESIA PRE PROCEDURE
Department of Anesthesiology  Preprocedure Note       Name:  Lord Durham   Age:  30 y.o.  :  1985                                          MRN:  [id number]         Date:  10/18/2022      Surgeon: Eb Schmidt):  Silvio Dye MD    Procedure: Procedure(s):  CYSTOSCOPY  POSSIBLE RETROGRADE PYELOGRAMS    Medications prior to admission:   Prior to Admission medications    Medication Sig Start Date End Date Taking? Authorizing Provider   allopurinol (ZYLOPRIM) 100 MG tablet Take 100 mg by mouth daily 22   Historical Provider, MD   Cyanocobalamin (VITAMIN B-12) 1000 MCG SUBL Place 1 tablet under the tongue daily 10/10/22   Historical Provider, MD   vitamin D (ERGOCALCIFEROL) 1.25 MG (63706 UT) CAPS capsule Take 50,000 Units by mouth once a week 10/9/22   Historical Provider, MD   ibuprofen (ADVIL;MOTRIN) 800 MG tablet Take 800 mg by mouth every 6 hours as needed 10/7/22   Historical Provider, MD   lisinopril (PRINIVIL;ZESTRIL) 40 MG tablet Take 40 mg by mouth nightly 22   Historical Provider, MD   pantoprazole (PROTONIX) 40 MG tablet Take 40 mg by mouth nightly 22   Historical Provider, MD   sertraline (ZOLOFT) 50 MG tablet Take 50 mg by mouth nightly 22   Historical Provider, MD   tamsulosin (FLOMAX) 0.4 MG capsule Take 0.8 mg by mouth nightly 22   Historical Provider, MD   traZODone (DESYREL) 50 MG tablet Take 50 mg by mouth nightly 22   Historical Provider, MD   oxybutynin (DITROPAN XL) 10 MG extended release tablet Take 1 tablet by mouth daily 10/12/22   MARINE Felix CNP       Current medications:    Current Facility-Administered Medications   Medication Dose Route Frequency Provider Last Rate Last Admin    levoFLOXacin (LEVAQUIN) 500 MG/100ML infusion 500 mg  500 mg IntraVENous Once MARINE Felix CNP           Allergies:  No Known Allergies    Problem List:  There is no problem list on file for this patient.       Past Medical History:        Diagnosis Date    Gross hematuria     with pain    Hypertension     Kidney stone        Past Surgical History:  History reviewed. No pertinent surgical history. Social History:    Social History     Tobacco Use    Smoking status: Never    Smokeless tobacco: Never   Substance Use Topics    Alcohol use: Not Currently                                Counseling given: Not Answered      Vital Signs (Current):   Vitals:    10/18/22 0817   BP: (!) 153/78   Pulse: 55   Resp: 20   Temp: 97.6 °F (36.4 °C)   TempSrc: Temporal   SpO2: 100%   Weight: (!) 310 lb (140.6 kg)   Height: 6' (1.829 m)                                              BP Readings from Last 3 Encounters:   10/18/22 (!) 153/78   10/12/22 (!) 148/80       NPO Status: Time of last liquid consumption: 2100                        Time of last solid consumption: 2100                        Date of last liquid consumption: 10/17/22                        Date of last solid food consumption: 10/17/22    BMI:   Wt Readings from Last 3 Encounters:   10/18/22 (!) 310 lb (140.6 kg)   10/17/22 (!) 310 lb (140.6 kg)   10/12/22 (!) 312 lb 3.2 oz (141.6 kg)     Body mass index is 42.04 kg/m².     CBC:   Lab Results   Component Value Date/Time    WBC 10.5 10/12/2022 12:12 PM    RBC 4.89 10/12/2022 12:12 PM    HGB 15.1 10/12/2022 12:12 PM    HCT 45.2 10/12/2022 12:12 PM    MCV 92.4 10/12/2022 12:12 PM    RDW 13.1 10/12/2022 12:12 PM     10/12/2022 12:12 PM       CMP:   Lab Results   Component Value Date/Time     10/12/2022 12:12 PM    K 4.1 10/12/2022 12:12 PM     10/12/2022 12:12 PM    CO2 29 10/12/2022 12:12 PM    BUN 12 10/12/2022 12:12 PM    CREATININE 0.8 10/12/2022 12:12 PM    GFRAA >59 10/12/2022 12:12 PM    LABGLOM >60 10/12/2022 12:12 PM    GLUCOSE 84 10/12/2022 12:12 PM    PROT 7.3 10/12/2022 12:12 PM    CALCIUM 9.1 10/12/2022 12:12 PM    BILITOT 0.5 10/12/2022 12:12 PM    ALKPHOS 79 10/12/2022 12:12 PM    AST 16 10/12/2022 12:12 PM    ALT 38 10/12/2022 12:12 PM       POC Tests: No results for input(s): POCGLU, POCNA, POCK, POCCL, POCBUN, POCHEMO, POCHCT in the last 72 hours. Coags: No results found for: PROTIME, INR, APTT    HCG (If Applicable): No results found for: PREGTESTUR, PREGSERUM, HCG, HCGQUANT     ABGs: No results found for: PHART, PO2ART, NEY4JBR, COE5YSW, BEART, N3QZMOXK     Type & Screen (If Applicable):  No results found for: LABABO, LABRH    Drug/Infectious Status (If Applicable):  No results found for: HIV, HEPCAB    COVID-19 Screening (If Applicable): No results found for: COVID19        Anesthesia Evaluation  Patient summary reviewed and Nursing notes reviewed no history of anesthetic complications:   Airway: Mallampati: II  TM distance: >3 FB   Neck ROM: full  Mouth opening: > = 3 FB   Dental:          Pulmonary:       (-) shortness of breath, sleep apnea and not a current smoker                           Cardiovascular:  Exercise tolerance: good (>4 METS),   (+) hypertension:,     (-) pacemaker, past MI, CAD, CABG/stent, dysrhythmias,  angina and no hyperlipidemia    ECG reviewed               Beta Blocker:  Not on Beta Blocker      ROS comment: EK BPM  Sinus rhythm  Comparison Summary: No serial comparison made  Summary: Normal ECG     Neuro/Psych:      (-) seizures and CVA           GI/Hepatic/Renal:   (+) renal disease: kidney stones, morbid obesity     (-) GERD       Endo/Other:    (+) no malignancy/cancer. (-) diabetes mellitus, blood dyscrasia, no malignancy/cancer               Abdominal:   (+) obese,           Vascular:     - DVT and PE. Other Findings:           Anesthesia Plan      general     ASA 3     (Pepcid and decadron in preop.)  Induction: intravenous. MIPS: Postoperative opioids intended and Prophylactic antiemetics administered. Anesthetic plan and risks discussed with patient.                         Yadira Willoughby DO   10/18/2022

## 2022-10-18 NOTE — OP NOTE
Brief operative Note      Patient: Bernadette De Dios  YOB: 1985  MRN: [id number]    Date of Procedure: 10/18/2022    Pre-Op Diagnosis: Gross hematuria [R31.0]    Post-Op Diagnosis: Same and anterior urethral stricture       Procedure(s):  CYSTOSCOPY; Urethral Dialation, Catheter insertion    Surgeon(s):  Tiffanie Gutierrez MD    Assistant:   * No surgical staff found *    Anesthesia: General LMA    Estimated Blood Loss (mL): 0    Complications: None    Specimens:   * No specimens in log *    Implants:  * No implants in log *      Drains:   Urinary Catheter 10/18/22 2 Way (Active)       Findings: Mid pendulous urethral stricture dilated from 18-24 Vasquez Bustle with Yessica dilators 21 [place] Izquierdo with 10 cc in balloon    Detailed Description of Procedure:   See dictated report: [id number]    Disposition to PACU then to op care outpatient home with Izquierdo catheter follow-up in approximately 5 days for catheter removal.  And follow-up to see APRN in 1 month.     Electronically signed by Agatha Daniel MD on 10/18/2022 at 10:47 AM

## 2022-10-18 NOTE — INTERVAL H&P NOTE
Update History & Physical    The patient's History and Physical of October 12, 2022 was reviewed with the patient and I examined the patient. There was no change. The surgical site was confirmed by the patient and me. Pt had CT Urogram 10/17/2022 final reading is pending. On my review normal kidneys, no stones no hydro, no masses. Delay imagine shows normal collecting system and ureters. Normal CT Urogram.     Plan: The risks, benefits, expected outcome, and alternative to the recommended procedure have been discussed with the patient. Patient understands and wants to proceed with the procedure.      Electronically signed by Joanna Holcomb MD on 10/18/2022 at 8:42 AM

## 2022-10-19 ENCOUNTER — APPOINTMENT (OUTPATIENT)
Dept: CT IMAGING | Age: 37
End: 2022-10-19
Payer: MEDICAID

## 2022-10-19 ENCOUNTER — HOSPITAL ENCOUNTER (EMERGENCY)
Age: 37
Discharge: HOME | End: 2022-10-20
Payer: MEDICAID

## 2022-10-19 DIAGNOSIS — Z76.89 ENCOUNTER FOR ASSESSMENT OF FOLEY CATHETER: ICD-10-CM

## 2022-10-19 DIAGNOSIS — K59.00 CONSTIPATION, UNSPECIFIED CONSTIPATION TYPE: Primary | ICD-10-CM

## 2022-10-19 LAB
ALBUMIN SERPL-MCNC: 4.3 G/DL (ref 3.5–5.2)
ALP BLD-CCNC: 64 U/L (ref 40–130)
ALT SERPL-CCNC: 32 U/L (ref 5–41)
ANION GAP SERPL CALCULATED.3IONS-SCNC: 11 MMOL/L (ref 7–19)
AST SERPL-CCNC: 13 U/L (ref 5–40)
BACTERIA: NEGATIVE /HPF
BASOPHILS ABSOLUTE: 0.1 K/UL (ref 0–0.2)
BASOPHILS RELATIVE PERCENT: 0.3 % (ref 0–1)
BILIRUB SERPL-MCNC: 0.5 MG/DL (ref 0.2–1.2)
BILIRUBIN URINE: ABNORMAL
BLOOD, URINE: ABNORMAL
BUN BLDV-MCNC: 15 MG/DL (ref 6–20)
CALCIUM SERPL-MCNC: 8.9 MG/DL (ref 8.6–10)
CHLORIDE BLD-SCNC: 101 MMOL/L (ref 98–111)
CLARITY: CLEAR
CO2: 25 MMOL/L (ref 22–29)
COLOR: ABNORMAL
CREAT SERPL-MCNC: 0.8 MG/DL (ref 0.5–1.2)
CRYSTALS, UA: ABNORMAL /HPF
EOSINOPHILS ABSOLUTE: 0.1 K/UL (ref 0–0.6)
EOSINOPHILS RELATIVE PERCENT: 0.8 % (ref 0–5)
EPITHELIAL CELLS, UA: 0 /HPF (ref 0–5)
GFR SERPL CREATININE-BSD FRML MDRD: >60 ML/MIN/{1.73_M2}
GLUCOSE BLD-MCNC: 98 MG/DL (ref 74–109)
GLUCOSE URINE: NEGATIVE MG/DL
HCT VFR BLD CALC: 43.3 % (ref 42–52)
HEMOGLOBIN: 14.7 G/DL (ref 14–18)
HYALINE CASTS: 1 /HPF (ref 0–8)
IMMATURE GRANULOCYTES #: 0.1 K/UL
KETONES, URINE: NEGATIVE MG/DL
LACTIC ACID: 1.1 MMOL/L (ref 0.5–1.9)
LEUKOCYTE ESTERASE, URINE: ABNORMAL
LIPASE: 14 U/L (ref 13–60)
LYMPHOCYTES ABSOLUTE: 3.3 K/UL (ref 1.1–4.5)
LYMPHOCYTES RELATIVE PERCENT: 22.5 % (ref 20–40)
MCH RBC QN AUTO: 30.8 PG (ref 27–31)
MCHC RBC AUTO-ENTMCNC: 33.9 G/DL (ref 33–37)
MCV RBC AUTO: 90.8 FL (ref 80–94)
MONOCYTES ABSOLUTE: 0.8 K/UL (ref 0–0.9)
MONOCYTES RELATIVE PERCENT: 5.3 % (ref 0–10)
NEUTROPHILS ABSOLUTE: 10.2 K/UL (ref 1.5–7.5)
NEUTROPHILS RELATIVE PERCENT: 70.5 % (ref 50–65)
NITRITE, URINE: POSITIVE
PDW BLD-RTO: 13.2 % (ref 11.5–14.5)
PH UA: 5 (ref 5–8)
PLATELET # BLD: 273 K/UL (ref 130–400)
PMV BLD AUTO: 9.4 FL (ref 9.4–12.4)
POTASSIUM SERPL-SCNC: 3.9 MMOL/L (ref 3.5–5)
PROCALCITONIN: 0.04 NG/ML (ref 0–0.09)
PROTEIN UA: 30 MG/DL
RBC # BLD: 4.77 M/UL (ref 4.7–6.1)
RBC UA: 7 /HPF (ref 0–4)
SODIUM BLD-SCNC: 137 MMOL/L (ref 136–145)
SPECIFIC GRAVITY UA: 1.03 (ref 1–1.03)
TOTAL PROTEIN: 7.1 G/DL (ref 6.6–8.7)
UROBILINOGEN, URINE: 1 E.U./DL
WBC # BLD: 14.5 K/UL (ref 4.8–10.8)
WBC UA: 1 /HPF (ref 0–5)

## 2022-10-19 PROCEDURE — 74177 CT ABD & PELVIS W/CONTRAST: CPT

## 2022-10-19 PROCEDURE — 99285 EMERGENCY DEPT VISIT HI MDM: CPT

## 2022-10-19 PROCEDURE — 2580000003 HC RX 258: Performed by: PHYSICIAN ASSISTANT

## 2022-10-19 PROCEDURE — 85025 COMPLETE CBC W/AUTO DIFF WBC: CPT

## 2022-10-19 PROCEDURE — 80053 COMPREHEN METABOLIC PANEL: CPT

## 2022-10-19 PROCEDURE — 36415 COLL VENOUS BLD VENIPUNCTURE: CPT

## 2022-10-19 PROCEDURE — 96374 THER/PROPH/DIAG INJ IV PUSH: CPT

## 2022-10-19 PROCEDURE — 6360000004 HC RX CONTRAST MEDICATION: Performed by: PHYSICIAN ASSISTANT

## 2022-10-19 PROCEDURE — 96375 TX/PRO/DX INJ NEW DRUG ADDON: CPT

## 2022-10-19 PROCEDURE — 84145 PROCALCITONIN (PCT): CPT

## 2022-10-19 PROCEDURE — 83605 ASSAY OF LACTIC ACID: CPT

## 2022-10-19 PROCEDURE — 83690 ASSAY OF LIPASE: CPT

## 2022-10-19 PROCEDURE — 81001 URINALYSIS AUTO W/SCOPE: CPT

## 2022-10-19 PROCEDURE — 96372 THER/PROPH/DIAG INJ SC/IM: CPT

## 2022-10-19 PROCEDURE — 74177 CT ABD & PELVIS W/CONTRAST: CPT | Performed by: RADIOLOGY

## 2022-10-19 PROCEDURE — 6360000002 HC RX W HCPCS: Performed by: PHYSICIAN ASSISTANT

## 2022-10-19 RX ORDER — DOCUSATE SODIUM 100 MG/1
100 CAPSULE, LIQUID FILLED ORAL 2 TIMES DAILY
Qty: 12 CAPSULE | Refills: 0 | Status: SHIPPED | OUTPATIENT
Start: 2022-10-19

## 2022-10-19 RX ORDER — ONDANSETRON 4 MG/1
4 TABLET, ORALLY DISINTEGRATING ORAL EVERY 8 HOURS PRN
Qty: 12 TABLET | Refills: 0 | Status: SHIPPED | OUTPATIENT
Start: 2022-10-19

## 2022-10-19 RX ORDER — MAGNESIUM CARB/ALUMINUM HYDROX 105-160MG
30 TABLET,CHEWABLE ORAL ONCE
Status: COMPLETED | OUTPATIENT
Start: 2022-10-19 | End: 2022-10-20

## 2022-10-19 RX ORDER — ONDANSETRON 2 MG/ML
4 INJECTION INTRAMUSCULAR; INTRAVENOUS ONCE
Status: COMPLETED | OUTPATIENT
Start: 2022-10-19 | End: 2022-10-19

## 2022-10-19 RX ORDER — 0.9 % SODIUM CHLORIDE 0.9 %
1000 INTRAVENOUS SOLUTION INTRAVENOUS ONCE
Status: COMPLETED | OUTPATIENT
Start: 2022-10-19 | End: 2022-10-20

## 2022-10-19 RX ORDER — HYDROMORPHONE HYDROCHLORIDE 1 MG/ML
1 INJECTION, SOLUTION INTRAMUSCULAR; INTRAVENOUS; SUBCUTANEOUS ONCE
Status: COMPLETED | OUTPATIENT
Start: 2022-10-19 | End: 2022-10-19

## 2022-10-19 RX ADMIN — SODIUM CHLORIDE 1000 ML: 9 INJECTION, SOLUTION INTRAVENOUS at 21:16

## 2022-10-19 RX ADMIN — IOPAMIDOL 70 ML: 755 INJECTION, SOLUTION INTRAVENOUS at 21:18

## 2022-10-19 RX ADMIN — HYDROMORPHONE HYDROCHLORIDE 1 MG: 1 INJECTION, SOLUTION INTRAMUSCULAR; INTRAVENOUS; SUBCUTANEOUS at 21:11

## 2022-10-19 RX ADMIN — ONDANSETRON 4 MG: 2 INJECTION INTRAMUSCULAR; INTRAVENOUS at 21:13

## 2022-10-19 ASSESSMENT — ENCOUNTER SYMPTOMS
ABDOMINAL PAIN: 1
NAUSEA: 1
RESPIRATORY NEGATIVE: 1
EYES NEGATIVE: 1
VOMITING: 0

## 2022-10-19 ASSESSMENT — PAIN SCALES - GENERAL: PAINLEVEL_OUTOF10: 8

## 2022-10-19 NOTE — OP NOTE
JOCELIN [employer] [facility] TABATHA Summers [address], [address] [facility]                                OPERATIVE REPORT    PATIENT NAME: Sue Guerrier                       :        1985  MED REC NO:   [id number]                              ROOM:  ACCOUNT NO:   [de-identified]                           ADMIT DATE: 10/18/2022  PROVIDER:     Miriam Padron MD    DATE OF PROCEDURE:  10/18/2022    TITLE OF OPERATION:  1. Flexible cystoscopy. 2.  Urethral dilation with dilation of urethral stricture. 3.  Temporary indwelling catheter insertion, simple. PREOPERATIVE DIAGNOSIS:  Gross hematuria. POSTOPERATIVE DIAGNOSES:  1.  Gross hematuria. 2.  Anterior urethral stricture. ATTENDING SURGEON:  Miriam Padron MD    ANESTHETIC:  General LMA anesthetic. ESTIMATED BLOOD LOSS:  0 mL. HISTORY:  The patient is a 51-year-old gentleman who presented to our  office complaining of gross hematuria and lower urinary tract symptoms. Therefore, he underwent a CT urogram which shows normal kidneys  bilaterally, normal-appearing collecting system and normal kidneys with  no stones, no hydronephrosis, no filling defects, and normal collecting  system on delayed images. To complete his evaluation, he is to undergo  cystoscopy. He says he cannot tolerate that while being awake and wants  to be put to sleep. Therefore, I discussed with him that since he is  asleep and if we find any findings, that we will plan to proceed as  indicated while he is asleep his indicated procedures. The risks and  complications of the procedure have been discussed with him. He  understands and agrees to proceed. DESCRIPTION OF PROCEDURE:  The patient was brought to the operating  room, underwent general anesthetic with LMA. He was placed in the  lithotomy position. His genitalia was prepped and draped per routine  sterile fashion. He received a preoperative antibiotic and a time-out  was performed. First, I began by doing testicular examination as he was  complaining of some testicular pain. Scrotum, scrotum skin and cord  structures were palpably normal.  Testes were palpably normal  bilaterally, and the epididymis were also palpably normal.  Normal  scrotal on testicular examination. First, I began by passing the flexible cystoscope into the patient's  meatus. He had a normal-appearing circumcised phallus with normal  meatus. I then advanced the flexible cystoscope under direct vision. When I got to the mid pendulous urethra, probably at the level of the  penoscrotal junction, there was a stricture and narrowing of the  urethra. I was able to pop the flexible scope, past this area, but this  did create sort of a little flimsy flap of mucosa here. The more  proximal anterior urethra and bulbar urethra were normal without  stricture. Entering into the prostatic urethra, the prostate was normal  with no obstruction. Entered into the patient's bladder, the bladder  was inspected with normal-appearing bladder. The right and left  ureteral orifices were identified, they were in normal anatomic position  and they were effluxing clear urine. The mucosa itself was normal  without papillary, erythematous or inflammatory lesions, no ulcerations  and there were no stones or foreign bodies. There was minimal  trabeculation. No diverticulum, cellules or other abnormalities. This  was basically normal cystoscopy of the bladder. At this point, I  elected to go ahead and dilate this urethral stricture, so a Amplatz  Super Stiff guidewire was inserted under direct vision into the bladder. This was confirmed fluoroscopically to be in the bladder. I then used  Lukasz dilator beginning at 18-Bulgarian and dilated very gently and very  easily up to 24-Bulgarian. At the end of the dilation, there was still  this little flappy mucosa, so I used some cold cup biopsy forceps to  sort of pull the flap out of the lumen. Again, it was very flimsy  tissue, paper-thin little flap. I then was able to place a 20-Belizean  Izquierdo catheter without difficulty after removing the guidewire. A 10 mL  was placed in the balloon, this was hand-irrigated. This was seated  against the bladder neck in good position. We will leave the Izquierdo  catheter in place for approximately 5 days. The estimated blood loss  was 0 mL. He was awakened from his anesthetic and taken to the recovery  room in stable condition. He will follow up to see the nurse for  catheter removal in 5 days and he will follow up to see the APRN in one  month.         Sigrid Pham MD    D: 10/18/2022 11:58:33      T: 10/18/2022 15:36:53     [id number]  Job#: [id number]     Doc#: [id number]    CC:

## 2022-10-20 VITALS
RESPIRATION RATE: 18 BRPM | DIASTOLIC BLOOD PRESSURE: 54 MMHG | SYSTOLIC BLOOD PRESSURE: 116 MMHG | TEMPERATURE: 97.6 F | OXYGEN SATURATION: 94 % | HEART RATE: 57 BPM

## 2022-10-20 PROCEDURE — 6370000000 HC RX 637 (ALT 250 FOR IP): Performed by: PHYSICIAN ASSISTANT

## 2022-10-20 PROCEDURE — 6360000002 HC RX W HCPCS: Performed by: PHYSICIAN ASSISTANT

## 2022-10-20 RX ADMIN — METHYLNALTREXONE BROMIDE 12 MG: 12 INJECTION, SOLUTION SUBCUTANEOUS at 00:11

## 2022-10-20 RX ADMIN — MINERAL OIL 30 ML: 1000 SOLUTION ORAL at 00:11

## 2022-10-20 NOTE — ED PROVIDER NOTES
[facility] EMERGENCY DEPT  EMERGENCY DEPARTMENT ENCOUNTER      Pt Name: Lord Durham  MRN: [id number]  [place] 1985  Date of evaluation: 10/19/2022  Provider: Susan Andres PA-C    CHIEF COMPLAINT       Chief Complaint   Patient presents with    Abdominal Pain     Had scope yesterday by Dr. Darin Gilliam here; sent home with catheter bleeding around site per pt- pain at insertion site- pt c/o severe abd pain         HISTORY OF PRESENT ILLNESS   (Location/Symptom, Timing/Onset, Context/Setting, Quality, Duration, Modifying Factors, Severity)  Note limiting factors. HPI      Lord Durham is a 30 y.o. male who presents to the emergency department with abdominal and catheter pain. PMH significant for hypertension, history of kidney stones. Patient states yesterday he was seen by urology for urethral stretching at which time a catheter was placed. Patient notes that he has been taking Pyridium and since has been draining large amounts of orange urine into the leg bag. Patient reports he is having pain and discomfort at the tip of his penis where the catheter inserts and describes that he feels like the catheter tubing is constantly falling out, moves when he stands up or pulls on the leg bag, and he feels that he is having urinary frequency however is not \"sensing the urgency. \"  Patient states that he feels like his suprapubic abdomen as well as right testicle have had increased pain and discomfort throughout the day. Patient became concerned he was bleeding into the leg bag, developed chills, developed nausea, developed diaphoresis and presents at this time for further evaluation. Patient denies any upper abdominal discomfort, flank pain, left-sided testicular or scrotal abnormalities. Patient denies any history of catheters previously or medication use outside of the Pyridium. Patient states he is supposed to wear the catheter bag for 5 days and follow-up with urology.   Patient did later on in the evaluation that he has been at very apprehensive to have a bowel movement because he feels when he pushes down the catheter is \"going to pop out. \"  Patient states that all day today he has felt the need to have a bowel movement but has held it in due to this. Records reviewed show patient was seen yesterday in the hospital for a cystoscopy, urethral dilation, catheter insertion due to anterior urethral stricture and kidney stones performed by Dr. Chidi Bowman. Nursing Notes were reviewed. REVIEW OF SYSTEMS    (2-9 systems for level 4, 10 or more for level 5)     Review of Systems   Constitutional:  Positive for chills and diaphoresis. Negative for fever. HENT: Negative. Eyes: Negative. Respiratory: Negative. Cardiovascular: Negative. Gastrointestinal:  Positive for abdominal pain (lower) and nausea. Negative for vomiting. Genitourinary:  Positive for hematuria, penile pain (at catheter insertion site), scrotal swelling (right), testicular pain (right) and urgency. Negative for flank pain. Musculoskeletal: Negative. Negative for myalgias. Skin: Negative. Neurological: Negative. Psychiatric/Behavioral: Negative. All other systems reviewed and are negative. Except as noted above the remainder of the review of systems was reviewed and negative.        PAST MEDICAL HISTORY     Past Medical History:   Diagnosis Date    Gross hematuria     with pain    Hypertension     Kidney stone          SURGICAL HISTORY       Past Surgical History:   Procedure Laterality Date    CYSTOSCOPY N/A 10/18/2022    Cystoscopy, Uretheral Dialation, Catheter insertion performed by Aster Cobb MD at [place] [address]       Previous Medications    ALLOPURINOL (ZYLOPRIM) 100 MG TABLET    Take 100 mg by mouth daily    CIPROFLOXACIN (CIPRO) 500 MG TABLET    Take 1 tablet by mouth daily for 7 days    CYANOCOBALAMIN (VITAMIN B-12) 1000 MCG SUBL    Place 1 tablet under the tongue daily HYDROCODONE-ACETAMINOPHEN (NORCO) 5-325 MG PER TABLET    Take 1 tablet by mouth every 6 hours as needed for Pain for up to 3 days. IBUPROFEN (ADVIL;MOTRIN) 800 MG TABLET    Take 800 mg by mouth every 6 hours as needed    LISINOPRIL (PRINIVIL;ZESTRIL) 40 MG TABLET    Take 40 mg by mouth nightly    OXYBUTYNIN (DITROPAN XL) 10 MG EXTENDED RELEASE TABLET    Take 1 tablet by mouth daily    PANTOPRAZOLE (PROTONIX) 40 MG TABLET    Take 40 mg by mouth nightly    PHENAZOPYRIDINE (PYRIDIUM) 100 MG TABLET    Take 1 tablet by mouth 3 times daily as needed for Pain (for burning and spasms)    SERTRALINE (ZOLOFT) 50 MG TABLET    Take 50 mg by mouth nightly    TAMSULOSIN (FLOMAX) 0.4 MG CAPSULE    Take 0.8 mg by mouth nightly    TRAZODONE (DESYREL) 50 MG TABLET    Take 50 mg by mouth nightly    VITAMIN D (ERGOCALCIFEROL) 1.25 MG (34543 UT) CAPS CAPSULE    Take 50,000 Units by mouth once a week       ALLERGIES     Patient has no known allergies. FAMILY HISTORY       Family History   Problem Relation Age of Onset    Cancer Paternal Uncle           SOCIAL HISTORY       Social History     Socioeconomic History    Marital status:    Tobacco Use    Smoking status: Never    Smokeless tobacco: Never   Substance and Sexual Activity    Alcohol use: Not Currently       SCREENINGS         Kamran Coma Scale  Eye Opening: Spontaneous  Best Verbal Response: Oriented  Best Motor Response: Obeys commands  Kamran Coma Scale Score: 15                     CIWA Assessment  BP: (!) 129/93  Heart Rate: 79                 PHYSICAL EXAM    (up to 7 for level 4, 8 or more for level 5)     ED Triage Vitals [10/19/22 1904]   BP Temp Temp Source Heart Rate Resp SpO2 Height Weight   (!) 129/93 97.7 °F (36.5 °C) Infrared 79 21 98 % -- --       Physical Exam  Vitals and nursing note reviewed. Exam conducted with a chaperone present (Chaperone present for entire evaluation, Jeny COSTELLO).    Constitutional:       General: He is in acute distress (appears uncomfortable due to pain). Appearance: Normal appearance. He is well-developed and well-groomed. He is obese. He is not toxic-appearing or diaphoretic. HENT:      Head: Normocephalic. Mouth/Throat:      Mouth: Mucous membranes are moist.      Pharynx: Oropharynx is clear. Eyes:      Conjunctiva/sclera: Conjunctivae normal.      Pupils: Pupils are equal, round, and reactive to light. Cardiovascular:      Rate and Rhythm: Normal rate and regular rhythm. Pulmonary:      Effort: Pulmonary effort is normal.      Breath sounds: Normal breath sounds. Abdominal:      Palpations: Abdomen is soft. Tenderness: There is abdominal tenderness (suprapubic). There is no right CVA tenderness or left CVA tenderness. Genitourinary:     Penis: Normal and circumcised. No tenderness, discharge, swelling or lesions. Testes:         Right: Tenderness present. Left: Tenderness or swelling not present. Comments: Catheter tubing in place with well-appearing urethral meatus with no skin breakdown, no bleeding. Leg bag attached appropriately with orange-colored urine consistent with Pyridium use. No gross hematuria or clots of blood. Right-sided testicular pain with minimal scrotal swelling. Musculoskeletal:         General: Normal range of motion. Cervical back: Normal range of motion and neck supple. Right lower leg: No edema. Left lower leg: No edema. Skin:     General: Skin is warm. Coloration: Skin is not jaundiced or pale. Neurological:      Mental Status: He is alert and oriented to person, place, and time. Psychiatric:         Attention and Perception: Attention normal.         Mood and Affect: Mood is anxious. Affect is tearful. Speech: Speech normal.         Behavior: Behavior normal. Behavior is cooperative.        DIAGNOSTIC RESULTS         RADIOLOGY:   Non-plain film images such as CT, Ultrasound and MRI are read by the radiologist. Lia Bonilla radiographic images are visualized and preliminarily interpreted by the emergency physician with the below findings:        Interpretation per the Radiologist below, if available at the time of this note:    CT ABDOMEN PELVIS W IV CONTRAST Additional Contrast? None   Final Result   Severe constipation   No evidence of obstruction   Recommendation: Follow up as clinically indicated. All CT scans at this facility utilize dose modulation, iterative reconstruction, and/or weight based dosing when appropriate to reduce radiation dose to as low as reasonably achievable. Electronically Signed by Calli Hinojosa MD at 19-Oct-2022 11:19:00 PM EST                     LABS:  Labs Reviewed   URINALYSIS - Abnormal; Notable for the following components:       Result Value    Color, UA ORANGE (*)     Bilirubin Urine MODERATE (*)     Blood, Urine SMALL (*)     Protein, UA 30 (*)     Nitrite, Urine POSITIVE (*)     Leukocyte Esterase, Urine MODERATE (*)     All other components within normal limits   CBC WITH AUTO DIFFERENTIAL - Abnormal; Notable for the following components:    WBC 14.5 (*)     Neutrophils % 70.5 (*)     Neutrophils Absolute 10.2 (*)     All other components within normal limits   MICROSCOPIC URINALYSIS - Abnormal; Notable for the following components:    Bacteria, UA NEGATIVE (*)     Crystals, UA NEG (*)     RBC, UA 7 (*)     All other components within normal limits   COMPREHENSIVE METABOLIC PANEL   LIPASE   LACTIC ACID   PROCALCITONIN       All other labs were within normal range or not returned as of this dictation.     EMERGENCY DEPARTMENT COURSE and DIFFERENTIAL DIAGNOSIS/MDM:   Vitals:    Vitals:    10/19/22 1904   BP: (!) 129/93   Pulse: 79   Resp: 21   Temp: 97.7 °F (36.5 °C)   TempSrc: Infrared   SpO2: 98%           MDM     Amount and/or Complexity of Data Reviewed  Clinical lab tests: reviewed and ordered  Tests in the radiology section of CPT®: reviewed and ordered  Tests in the medicine section of CPT®: ordered and reviewed  Decide to obtain previous medical records or to obtain history from someone other than the patient: yes  Review and summarize past medical records: yes    Patient Progress  Patient progress: improved        REASSESSMENT        CONSULTS:  None    PROCEDURES:  Unless otherwise noted below, none     Procedures        Shandra Heredia is a 30 y.o. male who presents to the emergency department with abdominal and catheter pain. PMH significant for hypertension, history of kidney stones. Work-up revealed positive nitrates in urine consistent with Pyridium use. Moderate leukocytes however negative bacteria, 1 white blood cell, lab work otherwise unremarkable including normal renal functions. Slight leukocytosis of 14.5 with normal lactic acid, normal procalcitonin. No further acute findings on CBC, CMP, normal lipase. CT imaging of the abdomen and pelvis performed with IV contrast showed no evidence of obstruction, severe constipation. Patient did state that prior to his procedure yesterday he does not routinely or intermittently use narcotics for which he was offered Relistor and wished to proceed. Patient was also given a dose of mineral oil. Throughout evaluation patient's Izquierdo catheter drained without any difficulties. Bulb was adjusted and educated patient on ability of catheter to move slightly stating that this is normal.  Reattached leg bag to be more secure. Patient was given a dose of pain medicine and reported feeling much better. Patient was given a fluid bolus secondary to contrast.  Discussed with patient need for follow-up with urology as previously scheduled as well as follow-up with his primary care provider within the next 48 hours for close reevaluation. Discussed tricked return precautions and need for immediate return to ED should he develop any new or worsening symptoms.   Patient is appreciative, with stable vital signs, with no further questions, concerns, needs at this time and is stable for discharge. FINAL IMPRESSION      1. Constipation, unspecified constipation type    2. Encounter for assessment of Izquierdo catheter          DISPOSITION/PLAN   DISPOSITION Discharge - Pending Orders Complete 10/19/2022 10:38:42 PM      PATIENT REFERRED TO:  MARINE Dos Santos - JITENDRA  Allyson Ybarra  [phone]    Schedule an appointment as soon as possible for a visit in 2 days      Cathleen Manzano MD  [address] [address] [phone]    Schedule an appointment as soon as possible for a visit in 2 days      [facility] EMERGENCY DEPT  [facility]  [phone]    As needed    DISCHARGE MEDICATIONS:  New Prescriptions    DOCUSATE SODIUM (COLACE) 100 MG CAPSULE    Take 1 capsule by mouth 2 times daily    ONDANSETRON (ZOFRAN ODT) 4 MG DISINTEGRATING TABLET    Take 1 tablet by mouth every 8 hours as needed for Nausea or Vomiting     Controlled Substances Monitoring:     No flowsheet data found.     (Please note that portions of this note were completed with a voice recognition program.  Efforts were made to edit the dictations but occasionally words are mis-transcribed.)    Eddy Saleem PA-C (electronically signed)            Eddy Saleem PA-C  10/19/22 [address]NABEL Dhaliwal  10/19/22 [id number]

## 2022-10-20 NOTE — DISCHARGE INSTRUCTIONS
Please begin taking MiraLAX daily, use the Colace as needed. Please increase hydration with water, increase fiber in your diet. Please follow-up with your urologist as previously scheduled for catheter reevaluation on Tuesday. Please follow-up with your primary care provider within the next 48 hours for close reevaluation. Should you develop any new or worsening symptoms please return to the ER for further evaluation.

## 2022-10-25 ENCOUNTER — NURSE ONLY (OUTPATIENT)
Dept: UROLOGY | Age: 37
End: 2022-10-25

## 2022-10-25 DIAGNOSIS — N40.1 BPH WITH OBSTRUCTION/LOWER URINARY TRACT SYMPTOMS: Primary | ICD-10-CM

## 2022-10-25 DIAGNOSIS — N13.8 BPH WITH OBSTRUCTION/LOWER URINARY TRACT SYMPTOMS: Primary | ICD-10-CM

## 2022-10-26 NOTE — PROGRESS NOTES
Patient of DESMOND Mclaughlin  presents today for voiding trial post cysto in the OR. The patient denies any fever, chills or  N&V. After patient had given consent, using the catheter in place, 120cc of sterile water was installed into the bladder with no complications. Patient was able to void 150cc. MARINE Godfrey was in office at time of procedure. Patient was advised to drink clear fluids for the next couple hours and urinate. Patient was advised they may experience some blood in the urine and burning with urination for the next couple days. If the patient is unable to urinate or develops fever, chills, N&V or suprapubic pain, they will call office for an appt at clinic or seek medical treatment at nearest ER, PCP or Urgent Care if after hours. Patient verbalized understanding and all questions were answered. Patient advised to call the office with any questions or concerns. Patient is to follow up as scheduled.

## 2022-12-19 ENCOUNTER — OFFICE VISIT (OUTPATIENT)
Dept: UROLOGY | Age: 37
End: 2022-12-19
Payer: MEDICAID

## 2022-12-19 VITALS — HEIGHT: 72 IN | BODY MASS INDEX: 42.66 KG/M2 | WEIGHT: 315 LBS | TEMPERATURE: 97.5 F

## 2022-12-19 DIAGNOSIS — N35.819 OTHER STRICTURE OF URETHRA IN MALE: Primary | ICD-10-CM

## 2022-12-19 DIAGNOSIS — N13.8 BPH WITH OBSTRUCTION/LOWER URINARY TRACT SYMPTOMS: ICD-10-CM

## 2022-12-19 DIAGNOSIS — Z87.442 HISTORY OF KIDNEY STONES: ICD-10-CM

## 2022-12-19 DIAGNOSIS — E29.1 HYPOGONADISM IN MALE: ICD-10-CM

## 2022-12-19 DIAGNOSIS — R31.0 GROSS HEMATURIA: ICD-10-CM

## 2022-12-19 DIAGNOSIS — N40.1 BPH WITH OBSTRUCTION/LOWER URINARY TRACT SYMPTOMS: ICD-10-CM

## 2022-12-19 LAB
APPEARANCE FLUID: CLEAR
BILIRUBIN, POC: NORMAL
BLOOD URINE, POC: NORMAL
CLARITY, POC: CLEAR
COLOR, POC: YELLOW
GLUCOSE URINE, POC: NORMAL
KETONES, POC: NORMAL
LEUKOCYTE EST, POC: NORMAL
NITRITE, POC: NORMAL
PH, POC: 7
PROTEIN, POC: NORMAL
SPECIFIC GRAVITY, POC: 1.02
UROBILINOGEN, POC: 0.2

## 2022-12-19 PROCEDURE — S0189 TESTOSTERONE PELLET 75 MG: HCPCS | Performed by: NURSE PRACTITIONER

## 2022-12-19 PROCEDURE — 99999 PR OFFICE/OUTPT VISIT,PROCEDURE ONLY: CPT | Performed by: NURSE PRACTITIONER

## 2022-12-19 PROCEDURE — 11980 IMPLANT HORMONE PELLET(S): CPT | Performed by: NURSE PRACTITIONER

## 2022-12-19 PROCEDURE — 81002 URINALYSIS NONAUTO W/O SCOPE: CPT | Performed by: NURSE PRACTITIONER

## 2022-12-19 RX ORDER — QUETIAPINE FUMARATE 100 MG/1
TABLET, FILM COATED ORAL
COMMUNITY
Start: 2022-12-04

## 2022-12-19 RX ORDER — AMLODIPINE BESYLATE 5 MG/1
TABLET ORAL
COMMUNITY
Start: 2022-11-26

## 2022-12-19 ASSESSMENT — ENCOUNTER SYMPTOMS
ABDOMINAL PAIN: 0
ABDOMINAL DISTENTION: 0
NAUSEA: 0
BACK PAIN: 0
VOMITING: 0

## 2022-12-19 NOTE — PROGRESS NOTES
Testopel Subcutaneous hormone pellet implantation:    The patient was placed on the exam table in the supine position. The upper outer quadrant of the right hip was identified for insertion. The area was then prepped with Betadine and injected with 1% Lidocaine to anesthetize superficially and then distally along the trocar tract. A 3mm incision was made using #11 blade scalpel. The trocar with a sharp-ended stylet was inserted into the subcutaneous tissue in line with the femur. The sharp stylet was withdrawn and the Testopel pellets were placed into the well of the trocar. Testopel was advanced into the tissue using blunt-ended stylet. A total of 6 pellets were inserted. The trocar was removed and the incision was closed using Steri-strips. The wound area was covered with Steri-strips. Careful inspection of the area was done. The patient was informed of the post-procedure instructions.  He will return in 3 months to determine scheduling of the next insertion with testosterone and CBC

## 2022-12-19 NOTE — PROGRESS NOTES
Walter Parada is a 30 y.o., male, Established patient who presents today   Chief Complaint   Patient presents with    Follow-up     I am here today for my 1 month post op       HPI   Patient presents for follow-up of several urologic issues. He did complete a work-up for gross hematuria with CT urogram and cystoscopy under anesthesia. CT urogram was without abnormalities, but cystoscopy under anesthesia did reveal a mid pendulous urethral stricture which required dilation from 18-24 Petrona An and Izquierdo catheterization after surgery. Patient reports since that time, his lower urinary tract symptoms have been much improved. He was also initiated on Ditropan at his previous visit with me a couple of months ago. He complains of feeling of incomplete emptying, frequency, nocturia x2. He has an AUA symptom score of 4/35 and bother score of 0 which is dramatically improved from his previous score of 20/35 with a bother score 5. He remains maintained on Flomax as well as Ditropan. Patient also with remote history of nephrolithiasis with his first stone occurring when he was 14years old. He has passed about 1 stone per year but has never required surgery. He has a strong family history of stones but stone composition is not known. Recent CT urogram did not reveal any nephrolithiasis. Patient also with history of low testosterone initially screened by his primary care provider secondary to increased fatigue, weakness, mood swings, lack of energy, erectile dysfunction. He has been approved for Testopel and we will implant thiose pellets today. REVIEW OF SYSTEMS:  Review of Systems   Constitutional:  Negative for chills and fever. Gastrointestinal:  Negative for abdominal distention, abdominal pain, nausea and vomiting. Genitourinary:  Negative for difficulty urinating, dysuria, flank pain, frequency, hematuria and urgency. Musculoskeletal:  Negative for back pain and gait problem. Psychiatric/Behavioral:  Negative for agitation and confusion. PHYSICAL EXAM:  Temp 97.5 °F (36.4 °C) (Temporal)   Ht 6' (1.829 m)   Wt (!) 315 lb 3.2 oz (143 kg)   BMI 42.75 kg/m²   Physical Exam  Vitals and nursing note reviewed. Constitutional:       General: He is not in acute distress. Appearance: Normal appearance. He is not ill-appearing. Pulmonary:      Effort: Pulmonary effort is normal. No respiratory distress. Abdominal:      General: There is no distension. Tenderness: There is no abdominal tenderness. There is no right CVA tenderness or left CVA tenderness. Neurological:      Mental Status: He is alert and oriented to person, place, and time. Mental status is at baseline. Psychiatric:         Mood and Affect: Mood normal.         Behavior: Behavior normal.       DATA:  Results for orders placed or performed in visit on 12/19/22   POCT Urinalysis no Micro   Result Value Ref Range    Color, UA YELLOW     Clarity, UA CLEAR     Glucose, UA POC NEG     Bilirubin, UA NEG     Ketones, UA NEG     Spec Grav, UA 1.020     Blood, UA POC TRACE     pH, UA 7     Protein, UA POC NEG     Urobilinogen, UA 0.2     Leukocytes, UA NEG     Nitrite, UA NEG     Appearance, Fluid Clear Clear, Slightly Cloudy     ASSESSMENT/PLAN  1. Other stricture of urethra in male  Stricture dilated during cystoscopy. Patient doing well since removal of Izquierdo catheter. Urinary symptoms have improved. 2. BPH with obstruction/lower urinary tract symptoms  Patient continues on Flomax and oxybutynin. Patient reports symptoms have improved since surgery as well as medication.  - POCT Urinalysis no Micro    3. Hypogonadism in male  Previous testosterone work-up revealed no further abnormalities. Testopel placed today and plan to follow-up in 3 months with labs prior.  - Testosterone; Future  - CBC; Future    4.  History of kidney stones  History of stones, but recent CT urogram did not reveal any nephrolithiasis. 5. Gross hematuria  Negative work-up in October. Orders Placed This Encounter   Procedures    Testosterone     In 6 mos     Standing Status:   Future     Standing Expiration Date:   12/19/2023    CBC     Standing Status:   Future     Standing Expiration Date:   12/19/2023    POCT Urinalysis no Micro        Return in about 3 months (around 3/19/2023) for with labs prior, can be virtual visit. An electronic signature was used to authenticate this note. ANGEL BRIAN, APRN - CNP    All information inputted into the note by the MA to include chief complaint, past medical history, past surgical history, medications, allergies, social and family history and review of systems has been reviewed and updated as needed by me. EMR Dragon/transcription disclaimer: Much of this document is electronic transcription/translation of spoken language to printed text. The electronic translation of spoken language may be erroneous or, at times, nonsensical words or phrases may be inadvertently transcribed.  Although I have reviewed the document for such errors, some may still exist.

## 2023-03-17 ENCOUNTER — TELEPHONE (OUTPATIENT)
Dept: UROLOGY | Age: 38
End: 2023-03-17

## 2023-03-17 NOTE — TELEPHONE ENCOUNTER
I called to inform patient that he will need labs drawn before his next appointment on Monday March 20th, 2023. I received no answer, but left a voicemail with all information that was needed.

## 2023-03-20 ENCOUNTER — OFFICE VISIT (OUTPATIENT)
Dept: UROLOGY | Age: 38
End: 2023-03-20
Payer: MEDICAID

## 2023-03-20 VITALS — WEIGHT: 315 LBS | BODY MASS INDEX: 42.66 KG/M2 | TEMPERATURE: 98.4 F | HEIGHT: 72 IN

## 2023-03-20 DIAGNOSIS — N40.1 BPH WITH OBSTRUCTION/LOWER URINARY TRACT SYMPTOMS: Primary | ICD-10-CM

## 2023-03-20 DIAGNOSIS — N35.819 OTHER STRICTURE OF URETHRA IN MALE: ICD-10-CM

## 2023-03-20 DIAGNOSIS — Z87.442 HISTORY OF KIDNEY STONES: ICD-10-CM

## 2023-03-20 DIAGNOSIS — N13.8 BPH WITH OBSTRUCTION/LOWER URINARY TRACT SYMPTOMS: Primary | ICD-10-CM

## 2023-03-20 DIAGNOSIS — R31.0 GROSS HEMATURIA: ICD-10-CM

## 2023-03-20 DIAGNOSIS — E29.1 HYPOGONADISM IN MALE: ICD-10-CM

## 2023-03-20 LAB
BACTERIA URINE, POC: 0
BILIRUBIN URINE: 0 MG/DL
BLOOD, URINE: POSITIVE
CASTS URINE, POC: 0
CLARITY: CLEAR
COLOR: YELLOW
CRYSTALS URINE, POC: 0
EPI CELLS URINE, POC: 0
ERYTHROCYTE [DISTWIDTH] IN BLOOD BY AUTOMATED COUNT: 13.1 % (ref 11.5–14.5)
GLUCOSE URINE: NORMAL
HCT VFR BLD AUTO: 44.1 % (ref 42–52)
HGB BLD-MCNC: 14.7 G/DL (ref 14–18)
KETONES, URINE: NEGATIVE
LEUKOCYTE EST, POC: NORMAL
MCH RBC QN AUTO: 31 PG (ref 27–31)
MCHC RBC AUTO-ENTMCNC: 33.3 G/DL (ref 33–37)
MCV RBC AUTO: 93 FL (ref 80–94)
NITRITE, URINE: NEGATIVE
PH UA: 6 (ref 4.5–8)
PLATELET # BLD AUTO: 262 K/UL (ref 130–400)
PMV BLD AUTO: 10.1 FL (ref 9.4–12.4)
POST VOID RESIDUAL (PVR): 0 ML
PROTEIN UA: NEGATIVE
RBC # BLD AUTO: 4.74 M/UL (ref 4.7–6.1)
RBC URINE, POC: 2
SPECIFIC GRAVITY UA: 1.02 (ref 1–1.03)
TESTOST SERPL-MCNC: 143.2 NG/DL (ref 249–836)
UROBILINOGEN, URINE: NORMAL
WBC # BLD AUTO: 10.4 K/UL (ref 4.8–10.8)
WBC URINE, POC: 0
YEAST URINE, POC: 0

## 2023-03-20 PROCEDURE — 81001 URINALYSIS AUTO W/SCOPE: CPT | Performed by: NURSE PRACTITIONER

## 2023-03-20 PROCEDURE — 99214 OFFICE O/P EST MOD 30 MIN: CPT | Performed by: NURSE PRACTITIONER

## 2023-03-20 PROCEDURE — 51798 US URINE CAPACITY MEASURE: CPT | Performed by: NURSE PRACTITIONER

## 2023-03-20 RX ORDER — FESOTERODINE FUMARATE 8 MG/1
8 TABLET, EXTENDED RELEASE ORAL DAILY
Qty: 90 TABLET | Refills: 3 | Status: SHIPPED | OUTPATIENT
Start: 2023-03-20

## 2023-03-21 ENCOUNTER — TELEPHONE (OUTPATIENT)
Dept: UROLOGY | Age: 38
End: 2023-03-21

## 2023-03-21 RX ORDER — TAMSULOSIN HYDROCHLORIDE 0.4 MG/1
0.8 CAPSULE ORAL NIGHTLY
Qty: 60 CAPSULE | Refills: 11 | Status: SHIPPED | OUTPATIENT
Start: 2023-03-21

## 2023-03-21 RX ORDER — TAMSULOSIN HYDROCHLORIDE 0.4 MG/1
0.8 CAPSULE ORAL NIGHTLY
Qty: 30 CAPSULE | Status: CANCELLED | OUTPATIENT
Start: 2023-03-21

## 2023-03-21 NOTE — TELEPHONE ENCOUNTER
Patients wife called wanting to know if patient could get a refill on flomax. Patient does not see the provider that first started prescribing this medication anymore.

## 2023-03-23 RX ORDER — TESTOSTERONE ENANTHATE 100 MG/.5ML
100 INJECTION SUBCUTANEOUS WEEKLY
Qty: 4 ADJUSTABLE DOSE PRE-FILLED PEN SYRINGE | Refills: 0 | Status: SHIPPED | OUTPATIENT
Start: 2023-03-23

## 2023-03-23 ASSESSMENT — ENCOUNTER SYMPTOMS
VOMITING: 0
BACK PAIN: 0
NAUSEA: 0
ABDOMINAL DISTENTION: 0
ABDOMINAL PAIN: 0

## 2023-03-30 ENCOUNTER — TELEPHONE (OUTPATIENT)
Dept: UROLOGY | Age: 38
End: 2023-03-30

## 2023-03-30 NOTE — TELEPHONE ENCOUNTER
Patients wife Romaine Morgan called to cancel patients Nurse visit for today due to not receiving injections in the mail yet. She states she will call back later to r/s when they come in. Thank you!

## 2023-04-24 DIAGNOSIS — E29.1 HYPOGONADISM IN MALE: ICD-10-CM

## 2023-04-25 DIAGNOSIS — E29.1 HYPOGONADISM IN MALE: ICD-10-CM

## 2023-04-25 RX ORDER — TESTOSTERONE ENANTHATE 100 MG/.5ML
100 INJECTION SUBCUTANEOUS WEEKLY
Qty: 4 ADJUSTABLE DOSE PRE-FILLED PEN SYRINGE | Refills: 0 | OUTPATIENT
Start: 2023-04-25

## 2023-04-25 RX ORDER — TESTOSTERONE ENANTHATE 100 MG/.5ML
INJECTION SUBCUTANEOUS
Qty: 4 ADJUSTABLE DOSE PRE-FILLED PEN SYRINGE | Refills: 0 | Status: SHIPPED | OUTPATIENT
Start: 2023-04-25

## 2023-05-18 DIAGNOSIS — E29.1 HYPOGONADISM IN MALE: ICD-10-CM

## 2023-05-18 RX ORDER — TESTOSTERONE ENANTHATE 100 MG/.5ML
INJECTION SUBCUTANEOUS
Qty: 4 ADJUSTABLE DOSE PRE-FILLED PEN SYRINGE | Refills: 0 | Status: SHIPPED | OUTPATIENT
Start: 2023-05-18 | End: 2023-06-12

## 2023-06-21 ENCOUNTER — OFFICE VISIT (OUTPATIENT)
Dept: UROLOGY | Age: 38
End: 2023-06-21
Payer: MEDICAID

## 2023-06-21 VITALS — WEIGHT: 315 LBS | TEMPERATURE: 98.2 F | BODY MASS INDEX: 42.66 KG/M2 | HEIGHT: 72 IN

## 2023-06-21 DIAGNOSIS — N40.1 BPH WITH OBSTRUCTION/LOWER URINARY TRACT SYMPTOMS: ICD-10-CM

## 2023-06-21 DIAGNOSIS — N35.819 OTHER STRICTURE OF URETHRA IN MALE: ICD-10-CM

## 2023-06-21 DIAGNOSIS — R31.0 GROSS HEMATURIA: ICD-10-CM

## 2023-06-21 DIAGNOSIS — N13.8 BPH WITH OBSTRUCTION/LOWER URINARY TRACT SYMPTOMS: ICD-10-CM

## 2023-06-21 DIAGNOSIS — E29.1 HYPOGONADISM IN MALE: Primary | ICD-10-CM

## 2023-06-21 DIAGNOSIS — Z87.442 HISTORY OF KIDNEY STONES: ICD-10-CM

## 2023-06-21 LAB
APPEARANCE FLUID: CLEAR
BILIRUBIN, POC: NORMAL
BLOOD URINE, POC: NORMAL
CLARITY, POC: CLEAR
COLOR, POC: YELLOW
GLUCOSE URINE, POC: NORMAL
KETONES, POC: NORMAL
LEUKOCYTE EST, POC: NORMAL
NITRITE, POC: NORMAL
PH, POC: 7
POST VOID RESIDUAL (PVR): 12 ML
PROTEIN, POC: NORMAL
SPECIFIC GRAVITY, POC: 1.02
UROBILINOGEN, POC: 1

## 2023-06-21 PROCEDURE — 51798 US URINE CAPACITY MEASURE: CPT | Performed by: NURSE PRACTITIONER

## 2023-06-21 PROCEDURE — 81002 URINALYSIS NONAUTO W/O SCOPE: CPT | Performed by: NURSE PRACTITIONER

## 2023-06-21 PROCEDURE — 99214 OFFICE O/P EST MOD 30 MIN: CPT | Performed by: NURSE PRACTITIONER

## 2023-06-21 RX ORDER — OXYBUTYNIN CHLORIDE 10 MG/1
TABLET, EXTENDED RELEASE ORAL
COMMUNITY

## 2023-06-21 RX ORDER — FLUTICASONE PROPIONATE 50 MCG
SPRAY, SUSPENSION (ML) NASAL
COMMUNITY
Start: 2019-05-01

## 2023-06-21 ASSESSMENT — ENCOUNTER SYMPTOMS
NAUSEA: 0
BACK PAIN: 0
ABDOMINAL DISTENTION: 0
ABDOMINAL PAIN: 0
VOMITING: 0

## 2023-06-21 NOTE — PROGRESS NOTES
Bebe Rodriguez is a 41 y.o., male, Established patient who presents today   Chief Complaint   Patient presents with    Follow-up     I am here today for my 3 month follow up to discuss my testosterone. HPI   Patient is a 32-year-old male who presents to clinic today for follow-up of several urologic issues. He is followed for lower urinary tract symptoms and complains of feeling of incomplete emptying, frequency, nocturia x2. Maintained on Flomax and Toviaz. Failed oxybutynin. He comes in today for reevaluation. AUA symptom score improved now 2/35 with only complaints of nocturia x2. Denies any side effects with Toviaz. Of note he did undergo complete work-up for gross hematuria in the past with CT urogram and cystoscopy under anesthesia which ultimately revealed a mid pendulous urethral stricture requiring dilation and Izquierdo catheterization after surgery. Denies any weakened stream.  Bladder scan today is 12 mL PVR. He is also followed for low testosterone. Was previously maintained on Testopel which he did not like. At last visit he was placed on Xyosted and he does report to me today improved symptoms of fatigue. No new labs today. REVIEW OF SYSTEMS:  Review of Systems   Constitutional:  Negative for chills and fever. Gastrointestinal:  Negative for abdominal distention, abdominal pain, nausea and vomiting. Genitourinary:  Negative for difficulty urinating, dysuria, flank pain, frequency, hematuria and urgency. Musculoskeletal:  Negative for back pain and gait problem. Psychiatric/Behavioral:  Negative for agitation and confusion. PHYSICAL EXAM:  Temp 98.2 °F (36.8 °C) (Temporal)   Ht 6' (1.829 m)   Wt (!) 344 lb 9.6 oz (156.3 kg)   BMI 46.74 kg/m²   Physical Exam  Vitals and nursing note reviewed. Constitutional:       General: He is not in acute distress. Appearance: Normal appearance. He is obese. He is not ill-appearing.    Pulmonary:      Effort: Pulmonary effort

## 2023-07-05 DIAGNOSIS — E29.1 HYPOGONADISM IN MALE: ICD-10-CM

## 2023-07-05 RX ORDER — TESTOSTERONE ENANTHATE 100 MG/.5ML
INJECTION SUBCUTANEOUS
Qty: 4 ADJUSTABLE DOSE PRE-FILLED PEN SYRINGE | Refills: 0 | Status: SHIPPED | OUTPATIENT
Start: 2023-07-05

## 2023-07-07 DIAGNOSIS — E29.1 HYPOGONADISM IN MALE: ICD-10-CM

## 2023-07-10 RX ORDER — TESTOSTERONE ENANTHATE 100 MG/.5ML
INJECTION SUBCUTANEOUS
OUTPATIENT
Start: 2023-07-10

## 2023-07-31 DIAGNOSIS — E29.1 HYPOGONADISM IN MALE: ICD-10-CM

## 2023-07-31 RX ORDER — TESTOSTERONE ENANTHATE 100 MG/.5ML
INJECTION SUBCUTANEOUS
Qty: 4 ADJUSTABLE DOSE PRE-FILLED PEN SYRINGE | Refills: 0 | Status: SHIPPED | OUTPATIENT
Start: 2023-07-31

## 2023-08-19 DIAGNOSIS — E29.1 HYPOGONADISM IN MALE: ICD-10-CM

## 2023-08-21 RX ORDER — TESTOSTERONE ENANTHATE 100 MG/.5ML
INJECTION SUBCUTANEOUS
Qty: 4 ADJUSTABLE DOSE PRE-FILLED PEN SYRINGE | Refills: 0 | Status: SHIPPED | OUTPATIENT
Start: 2023-08-21

## 2023-09-14 DIAGNOSIS — E29.1 HYPOGONADISM IN MALE: ICD-10-CM

## 2023-09-15 RX ORDER — TESTOSTERONE ENANTHATE 100 MG/.5ML
INJECTION SUBCUTANEOUS
Qty: 4 ADJUSTABLE DOSE PRE-FILLED PEN SYRINGE | Refills: 0 | Status: SHIPPED | OUTPATIENT
Start: 2023-09-15 | End: 2023-09-22 | Stop reason: SDUPTHER

## 2023-09-20 ENCOUNTER — PATIENT MESSAGE (OUTPATIENT)
Dept: UROLOGY | Age: 38
End: 2023-09-20

## 2023-09-22 ENCOUNTER — TELEPHONE (OUTPATIENT)
Dept: UROLOGY | Age: 38
End: 2023-09-22

## 2023-09-22 DIAGNOSIS — E29.1 HYPOGONADISM IN MALE: ICD-10-CM

## 2023-09-22 RX ORDER — TESTOSTERONE ENANTHATE 100 MG/.5ML
100 INJECTION SUBCUTANEOUS WEEKLY
Qty: 4 ADJUSTABLE DOSE PRE-FILLED PEN SYRINGE | Refills: 0 | Status: SHIPPED | OUTPATIENT
Start: 2023-09-22

## 2023-09-22 NOTE — TELEPHONE ENCOUNTER
I called the patient and spoke with his wife. I verbalized we had sent in that script for the injectable pens to the [facility]. She verbalized understanding.

## 2023-09-22 NOTE — TELEPHONE ENCOUNTER
----- Message from MARINE Cabrera CNP sent at 9/22/2023  9:08 AM CDT -----  Regarding: RE: Med question  Contact: [phone]  Sent    ----- Message -----  From: Leah Anderson MA  Sent: 9/22/2023   7:52 AM CDT  To: MARINE Cabrera CNP  Subject: FW: Med question                                 Please advise if appropriate.   ----- Message -----  From: Radha Ashby"  Sent: 9/21/2023   6:45 PM CDT  To: [facility] Urology Clinical Staff  Subject: Med question                                     Dada Evangelista said send the script to them for the injection pens they will try and order them.

## 2023-10-11 ENCOUNTER — OFFICE VISIT (OUTPATIENT)
Dept: UROLOGY | Age: 38
End: 2023-10-11
Payer: MEDICAID

## 2023-10-11 VITALS — BODY MASS INDEX: 42.66 KG/M2 | WEIGHT: 315 LBS | HEIGHT: 72 IN | TEMPERATURE: 98.2 F

## 2023-10-11 DIAGNOSIS — N35.819 OTHER STRICTURE OF URETHRA IN MALE: ICD-10-CM

## 2023-10-11 DIAGNOSIS — R31.0 GROSS HEMATURIA: ICD-10-CM

## 2023-10-11 DIAGNOSIS — N13.8 BPH WITH OBSTRUCTION/LOWER URINARY TRACT SYMPTOMS: ICD-10-CM

## 2023-10-11 DIAGNOSIS — N40.1 BPH WITH OBSTRUCTION/LOWER URINARY TRACT SYMPTOMS: ICD-10-CM

## 2023-10-11 DIAGNOSIS — E29.1 HYPOGONADISM IN MALE: ICD-10-CM

## 2023-10-11 DIAGNOSIS — E29.1 HYPOGONADISM IN MALE: Primary | ICD-10-CM

## 2023-10-11 LAB
APPEARANCE FLUID: CLEAR
BILIRUBIN, POC: NORMAL
BLOOD URINE, POC: NORMAL
CLARITY, POC: CLEAR
COLOR, POC: YELLOW
ERYTHROCYTE [DISTWIDTH] IN BLOOD BY AUTOMATED COUNT: 14.1 % (ref 11.5–14.5)
GLUCOSE URINE, POC: NORMAL
HCT VFR BLD AUTO: 49 % (ref 42–52)
HGB BLD-MCNC: 16.1 G/DL (ref 14–18)
KETONES, POC: NORMAL
LEUKOCYTE EST, POC: NORMAL
MCH RBC QN AUTO: 30.3 PG (ref 27–31)
MCHC RBC AUTO-ENTMCNC: 32.9 G/DL (ref 33–37)
MCV RBC AUTO: 92.3 FL (ref 80–94)
NITRITE, POC: NORMAL
PH, POC: 5
PLATELET # BLD AUTO: 232 K/UL (ref 130–400)
PMV BLD AUTO: 9.7 FL (ref 9.4–12.4)
PROTEIN, POC: NORMAL
RBC # BLD AUTO: 5.31 M/UL (ref 4.7–6.1)
SPECIFIC GRAVITY, POC: 1.02
TESTOST SERPL-MCNC: 414.8 NG/DL (ref 249–836)
UROBILINOGEN, POC: 0.2
WBC # BLD AUTO: 8.6 K/UL (ref 4.8–10.8)

## 2023-10-11 PROCEDURE — 99214 OFFICE O/P EST MOD 30 MIN: CPT | Performed by: NURSE PRACTITIONER

## 2023-10-11 PROCEDURE — 81002 URINALYSIS NONAUTO W/O SCOPE: CPT | Performed by: NURSE PRACTITIONER

## 2023-10-11 RX ORDER — TESTOSTERONE ENANTHATE 100 MG/.5ML
100 INJECTION SUBCUTANEOUS WEEKLY
Qty: 4 ADJUSTABLE DOSE PRE-FILLED PEN SYRINGE | Refills: 0 | Status: SHIPPED | OUTPATIENT
Start: 2023-10-11

## 2023-10-11 ASSESSMENT — ENCOUNTER SYMPTOMS
ABDOMINAL PAIN: 0
VOMITING: 0
BACK PAIN: 0
NAUSEA: 0
ABDOMINAL DISTENTION: 0

## 2023-10-11 NOTE — PROGRESS NOTES
Yogi Bartlett is a 41 y.o., male, Established patient who presents today   Chief Complaint   Patient presents with    Follow-up     I am here today for my 4 month follow up. My labs were done this morning. Patient presents for follow-up of several urologic issues. He is followed for low testosterone. He had undergone 1 Testopel procedure, but his levels are subtherapeutic and patient did not wish to continue with that replacement method. He has been on [facility] for a few months and does report that his symptoms are pretty well controlled. Pretreatment symptoms include increased fatigue, weakness, mood swings, lack of energy, erectile dysfunction. He reports he typically gives his injections on Sunday nights and while he notices more pain on the right side from his injections, he is doing well with these at home. Lab Results   Component Value Date    TESTOSTERONE 414.8 10/11/2023    TESTOSTERONE 143.2 (L) 03/20/2023    TESTOSTERONE 171 (L) 10/12/2022        Lab Results   Component Value Date    WBC 8.6 10/11/2023    HGB 16.1 10/11/2023    HCT 49.0 10/11/2023    MCV 92.3 10/11/2023     10/11/2023        Patient also follows for lower urinary tract symptoms with feeling of incomplete emptying, frequency, nocturia. He is currently maintained on Flomax as well as Toviaz. He has previously failed oxybutynin. He reports his symptoms are fairly well controlled with his current therapy. He is also undergone prior work-up for gross hematuria with CT urogram and cystoscopy in October 2022. Other than a mid pendulous urethral stricture requiring dilation and Izquierdo catheterization after surgery, no significant abnormalities were noted. REVIEW OF SYSTEMS:  Review of Systems   Constitutional:  Negative for chills and fever. Gastrointestinal:  Negative for abdominal distention, abdominal pain, nausea and vomiting.    Genitourinary:  Negative for difficulty urinating, dysuria, flank pain, frequency,

## 2024-01-28 DIAGNOSIS — E29.1 HYPOGONADISM IN MALE: ICD-10-CM

## 2024-01-29 RX ORDER — TESTOSTERONE ENANTHATE 100 MG/.5ML
INJECTION SUBCUTANEOUS
Qty: 4 ML | Refills: 0 | Status: SHIPPED | OUTPATIENT
Start: 2024-01-29

## 2024-02-23 DIAGNOSIS — E29.1 HYPOGONADISM IN MALE: ICD-10-CM

## 2024-02-23 RX ORDER — TESTOSTERONE ENANTHATE 100 MG/.5ML
INJECTION SUBCUTANEOUS
Qty: 4 ML | Refills: 0 | Status: SHIPPED | OUTPATIENT
Start: 2024-02-23

## 2024-03-01 DIAGNOSIS — N40.1 BPH WITH OBSTRUCTION/LOWER URINARY TRACT SYMPTOMS: ICD-10-CM

## 2024-03-01 DIAGNOSIS — N13.8 BPH WITH OBSTRUCTION/LOWER URINARY TRACT SYMPTOMS: ICD-10-CM

## 2024-03-01 RX ORDER — FESOTERODINE FUMARATE 8 MG/1
TABLET, EXTENDED RELEASE ORAL
Qty: 90 TABLET | Refills: 0 | Status: SHIPPED | OUTPATIENT
Start: 2024-03-01

## 2024-03-13 RX ORDER — TAMSULOSIN HYDROCHLORIDE 0.4 MG/1
0.8 CAPSULE ORAL NIGHTLY
Qty: 60 CAPSULE | Refills: 0 | Status: SHIPPED | OUTPATIENT
Start: 2024-03-13

## 2024-03-24 DIAGNOSIS — E29.1 HYPOGONADISM IN MALE: ICD-10-CM

## 2024-03-25 RX ORDER — TESTOSTERONE ENANTHATE 100 MG/.5ML
INJECTION SUBCUTANEOUS
Qty: 4 ML | Refills: 0 | Status: SHIPPED | OUTPATIENT
Start: 2024-03-25

## 2024-04-15 RX ORDER — TAMSULOSIN HYDROCHLORIDE 0.4 MG/1
0.8 CAPSULE ORAL NIGHTLY
Qty: 60 CAPSULE | Refills: 0 | Status: SHIPPED | OUTPATIENT
Start: 2024-04-15

## 2024-05-13 DIAGNOSIS — E29.1 HYPOGONADISM IN MALE: ICD-10-CM

## 2024-05-13 RX ORDER — TESTOSTERONE ENANTHATE 100 MG/.5ML
100 INJECTION SUBCUTANEOUS WEEKLY
Qty: 4 ADJUSTABLE DOSE PRE-FILLED PEN SYRINGE | Refills: 0 | Status: SHIPPED | OUTPATIENT
Start: 2024-05-13 | End: 2024-05-14

## 2024-05-13 NOTE — TELEPHONE ENCOUNTER
Patient's wife called stating the pharmacy told them they haven't gotten a response from us about his testosterone and he is out. We haven't received a request, but I verbalized I would get one sent back now.

## 2024-05-14 DIAGNOSIS — E29.1 HYPOGONADISM IN MALE: Primary | ICD-10-CM

## 2024-05-14 RX ORDER — NEEDLES, DISPOSABLE 25GX5/8"
4 NEEDLE, DISPOSABLE MISCELLANEOUS
Qty: 50 EACH | Refills: 1 | Status: SHIPPED | OUTPATIENT
Start: 2024-05-14 | End: 2024-11-10

## 2024-05-14 RX ORDER — TESTOSTERONE ENANTHATE 100 MG/.5ML
INJECTION SUBCUTANEOUS
Qty: 4 ML | Refills: 0 | OUTPATIENT
Start: 2024-05-14

## 2024-05-14 RX ORDER — TESTOSTERONE CYPIONATE 200 MG/ML
100 INJECTION, SOLUTION INTRAMUSCULAR WEEKLY
Qty: 3 ML | Refills: 0 | Status: SHIPPED | OUTPATIENT
Start: 2024-05-14 | End: 2024-06-13

## 2024-05-14 NOTE — TELEPHONE ENCOUNTER
I called and informed the patient's wife of this situation. I had to leave a voicemail explaining that I gave the insurance all the office notes, and information, but they did deny the authorization for Xyosted. I verbalized we can switch to the IM injections, but we would need to make sure he's comfortable with injecting this way and knows how to properly do so. I left a number to call us back.

## 2024-05-30 ENCOUNTER — OFFICE VISIT (OUTPATIENT)
Dept: UROLOGY | Age: 39
End: 2024-05-30
Payer: COMMERCIAL

## 2024-05-30 VITALS — TEMPERATURE: 98.1 F | HEIGHT: 72 IN | BODY MASS INDEX: 42.66 KG/M2 | WEIGHT: 315 LBS

## 2024-05-30 DIAGNOSIS — E29.1 HYPOGONADISM IN MALE: ICD-10-CM

## 2024-05-30 DIAGNOSIS — N40.1 BPH WITH OBSTRUCTION/LOWER URINARY TRACT SYMPTOMS: Primary | ICD-10-CM

## 2024-05-30 DIAGNOSIS — R31.0 GROSS HEMATURIA: ICD-10-CM

## 2024-05-30 DIAGNOSIS — N13.8 BPH WITH OBSTRUCTION/LOWER URINARY TRACT SYMPTOMS: Primary | ICD-10-CM

## 2024-05-30 LAB
BACTERIA URINE, POC: NORMAL
BILIRUBIN URINE: 0 MG/DL
BLOOD, URINE: POSITIVE
CASTS URINE, POC: NORMAL
CLARITY: CLEAR
COLOR: YELLOW
CRYSTALS URINE, POC: NORMAL
EPI CELLS URINE, POC: NORMAL
GLUCOSE URINE: NORMAL
KETONES, URINE: NEGATIVE
LEUKOCYTE EST, POC: NORMAL
NITRITE, URINE: NEGATIVE
PH UA: 6 (ref 4.5–8)
PROTEIN UA: NEGATIVE
RBC URINE, POC: 4
SPECIFIC GRAVITY UA: 1.02 (ref 1–1.03)
UROBILINOGEN, URINE: NORMAL
WBC URINE, POC: NORMAL
YEAST URINE, POC: NORMAL

## 2024-05-30 PROCEDURE — 81001 URINALYSIS AUTO W/SCOPE: CPT | Performed by: NURSE PRACTITIONER

## 2024-05-30 PROCEDURE — 99214 OFFICE O/P EST MOD 30 MIN: CPT | Performed by: NURSE PRACTITIONER

## 2024-05-30 RX ORDER — TAMSULOSIN HYDROCHLORIDE 0.4 MG/1
0.8 CAPSULE ORAL NIGHTLY
Qty: 180 CAPSULE | Refills: 3 | Status: SHIPPED | OUTPATIENT
Start: 2024-05-30

## 2024-05-30 RX ORDER — TESTOSTERONE CYPIONATE 200 MG/ML
200 INJECTION, SOLUTION INTRAMUSCULAR WEEKLY
Qty: 4 ML | Refills: 0 | Status: SHIPPED | OUTPATIENT
Start: 2024-05-30 | End: 2024-06-29

## 2024-05-30 RX ORDER — FESOTERODINE FUMARATE 8 MG/1
8 TABLET, FILM COATED, EXTENDED RELEASE ORAL DAILY
Qty: 90 TABLET | Refills: 3 | Status: SHIPPED | OUTPATIENT
Start: 2024-05-30

## 2024-05-30 ASSESSMENT — ENCOUNTER SYMPTOMS
ABDOMINAL PAIN: 0
NAUSEA: 0
VOMITING: 0
BACK PAIN: 0
ABDOMINAL DISTENTION: 0

## 2024-05-30 NOTE — PROGRESS NOTES
John Adams is a 39 y.o., male, Established patient who presents today   Chief Complaint   Patient presents with    Follow-up     I am here today for my follow up.       Patient presents for follow-up of several urologic issues.  He is followed for low testosterone.  He had undergone 1 Testopel procedure, but his levels are subtherapeutic and patient did not wish to continue with that replacement method.  He has been on Xyosted for a few months and symptoms were well-controlled, however, recent insurance change will not cover the medication.  Patient was most recently switched back to testosterone cypionate at 100 mg/week.  His labs were drawn about 5 days after his injection and are subtherapeutic.  Pretreatment symptoms include increased fatigue, weakness, mood swings, lack of energy, erectile dysfunction.  He reports he typically gives his injections on Sunday nights.     Lab Results   Component Value Date    TESTOSTERONE 414.8 10/11/2023    TESTOSTERONE 143.2 (L) 03/20/2023    TESTOSTERONE 171 (L) 10/12/2022      Lab Results   Component Value Date    WBC 8.6 10/11/2023    HGB 16.1 10/11/2023    HCT 49.0 10/11/2023    MCV 92.3 10/11/2023     10/11/2023      Testosterone 5/17/24 266  H/H  5/17/24 16.5/47.7  PSA  5/17/24 0.5     Patient also follows for lower urinary tract symptoms with feeling of incomplete emptying, frequency, nocturia.  He is currently maintained on Flomax as well as Toviaz.  He has previously failed oxybutynin.  He reports his symptoms are fairly well controlled with his current therapy.     He has also undergone prior work-up for gross hematuria with CT urogram and cystoscopy in October 2022.  Other than a mid pendulous urethral stricture requiring dilation and Izquierdo catheterization after surgery, no significant abnormalities were noted.     REVIEW OF SYSTEMS:  Review of Systems   Constitutional:  Negative for chills and fever.   Gastrointestinal:  Negative for abdominal distention,

## 2024-07-28 NOTE — PROGRESS NOTES
Va David is a 41 y.o., male, Established patient who presents today   Chief Complaint   Patient presents with    Follow-up     I am here today for my 3 mo follow up. HPI   Patient presents for follow-up of several urologic issues. He is followed for lower urinary tract symptoms and complains of feeling of incomplete emptying, frequency, nocturia x2. He is currently maintained on oxybutynin as well as Flomax. He did complete a complete work-up for gross hematuria in the past with CT urogram and cystoscopy under anesthesia which ultimately revealed a mid pendulous urethral stricture requiring dilation and Izquierdo catheterization after the surgery. Patient does not currently have complaints of slow or weakened stream.  Postvoid residual is 0. He is also followed for low testosterone. He most recently had his first Testopel implantation on 12/19/2022 with 6 pellets. His testosterone is subtherapeutic at this time. Patient reports he did experience a relief of symptoms, but it only lasted about 3 weeks or so. Pretreatment symptoms include  increased fatigue, weakness, mood swings, lack of energy, erectile dysfunction. Lab Results   Component Value Date    TESTOSTERONE 143.2 (L) 03/20/2023       Lab Results   Component Value Date    WBC 10.4 03/20/2023    HGB 14.7 03/20/2023    HCT 44.1 03/20/2023    MCV 93.0 03/20/2023     03/20/2023     Patient also with history of nephrolithiasis with his first stone about 14years old. He has passed about 1 stone per year, but has never required surgery. Does have a strong family history of stones but composition is not known. Recent CT urogram did not reveal any nephrolithiasis. REVIEW OF SYSTEMS:  Review of Systems   Constitutional:  Negative for chills and fever. Gastrointestinal:  Negative for abdominal distention, abdominal pain, nausea and vomiting. Genitourinary:  Positive for frequency.  Negative for difficulty urinating, dysuria, flank English

## 2024-11-18 ENCOUNTER — TELEPHONE (OUTPATIENT)
Dept: UROLOGY | Age: 39
End: 2024-11-18

## 2024-11-18 DIAGNOSIS — E29.1 HYPOGONADISM IN MALE: Primary | ICD-10-CM

## 2024-11-18 NOTE — TELEPHONE ENCOUNTER
Patient wife called asking for a  Lab order to be put in for testosterone. Please send order to [facility] in [place]. Please call patient when order has been sent,    Thank you!

## 2025-02-12 ENCOUNTER — TELEPHONE (OUTPATIENT)
Dept: UROLOGY | Age: 40
End: 2025-02-12

## 2025-02-12 DIAGNOSIS — E29.1 HYPOGONADISM IN MALE: Primary | ICD-10-CM

## 2025-02-12 NOTE — TELEPHONE ENCOUNTER
Marcella called to inform office that pt is back with [facility] Medicaid insurance. Want to know can have labs on same day as appt. Pt will bring insurance card to appt. Please advise.

## 2025-02-12 NOTE — TELEPHONE ENCOUNTER
Contacted patient's wife and let her know that we do not allow same day labs but I can send orders to a facility closer to home for them to complete. Let her know that results would need to be faxed and advised her to get a physical copy for them to bring just in case we do not receive them for any reason. She voiced understanding.

## 2025-02-12 NOTE — TELEPHONE ENCOUNTER
contacted patient regarding follow up scheduled on 2/17/2025. Patient added on by [facility] for follow up to discuss getting back on testosterone. At last visit, he was referred to an External Urologist due to [facility] being out of network.    I left VM letting patient know that we would need to be provided his new insurance information prior to appointment to verify that it is in network with [facility]. I also let him know that since it has been awhile that we have seen him, that we would need to check with the provider to make sure labs are not needed prior to seeing him in office.

## 2025-02-17 ENCOUNTER — OFFICE VISIT (OUTPATIENT)
Dept: UROLOGY | Age: 40
End: 2025-02-17
Payer: MEDICAID

## 2025-02-17 VITALS — HEIGHT: 72 IN | TEMPERATURE: 97.2 F | WEIGHT: 315 LBS | BODY MASS INDEX: 42.66 KG/M2

## 2025-02-17 DIAGNOSIS — N35.819 OTHER STRICTURE OF URETHRA IN MALE: ICD-10-CM

## 2025-02-17 DIAGNOSIS — R31.0 GROSS HEMATURIA: ICD-10-CM

## 2025-02-17 DIAGNOSIS — N13.8 BPH WITH OBSTRUCTION/LOWER URINARY TRACT SYMPTOMS: Primary | ICD-10-CM

## 2025-02-17 DIAGNOSIS — E29.1 HYPOGONADISM IN MALE: ICD-10-CM

## 2025-02-17 DIAGNOSIS — N40.1 BPH WITH OBSTRUCTION/LOWER URINARY TRACT SYMPTOMS: Primary | ICD-10-CM

## 2025-02-17 LAB
BACTERIA URINE, POC: NORMAL
BILIRUBIN URINE: 1 MG/DL
BLOOD, URINE: POSITIVE
CASTS URINE, POC: NORMAL
CLARITY, UA: CLEAR
COLOR, UA: YELLOW
CRYSTALS URINE, POC: NORMAL
EPI CELLS URINE, POC: NORMAL
GLUCOSE URINE: NORMAL
KETONES, URINE: POSITIVE
LEUKOCYTE EST, POC: NORMAL
NITRITE, URINE: NEGATIVE
PH UA: 6 (ref 4.5–8)
PROTEIN UA: NEGATIVE
RBC URINE, POC: 5
SPECIFIC GRAVITY UA: 1.02 (ref 1–1.03)
UROBILINOGEN, URINE: NORMAL
WBC URINE, POC: NORMAL
YEAST URINE, POC: NORMAL

## 2025-02-17 PROCEDURE — 99214 OFFICE O/P EST MOD 30 MIN: CPT | Performed by: NURSE PRACTITIONER

## 2025-02-17 PROCEDURE — 51798 US URINE CAPACITY MEASURE: CPT | Performed by: NURSE PRACTITIONER

## 2025-02-17 PROCEDURE — 81001 URINALYSIS AUTO W/SCOPE: CPT | Performed by: NURSE PRACTITIONER

## 2025-02-17 RX ORDER — TAMSULOSIN HYDROCHLORIDE 0.4 MG/1
0.8 CAPSULE ORAL NIGHTLY
Qty: 180 CAPSULE | Refills: 3 | Status: SHIPPED | OUTPATIENT
Start: 2025-02-17

## 2025-02-17 RX ORDER — TESTOSTERONE ENANTHATE 100 MG/.5ML
100 INJECTION SUBCUTANEOUS WEEKLY
Qty: 4 ADJUSTABLE DOSE PRE-FILLED PEN SYRINGE | Refills: 0 | Status: SHIPPED | OUTPATIENT
Start: 2025-02-17

## 2025-02-17 RX ORDER — FESOTERODINE FUMARATE 8 MG/1
8 TABLET, FILM COATED, EXTENDED RELEASE ORAL DAILY
Qty: 90 TABLET | Refills: 3 | Status: SHIPPED | OUTPATIENT
Start: 2025-02-17

## 2025-02-17 ASSESSMENT — ENCOUNTER SYMPTOMS
NAUSEA: 0
ABDOMINAL PAIN: 0
ABDOMINAL DISTENTION: 0
VOMITING: 0

## 2025-02-17 NOTE — PROGRESS NOTES
John Adams is a 39 y.o., male, Established patient who presents today   Chief Complaint   Patient presents with    Follow-up     I am here for a 10 month follow up   I am wanting to discuss getting back on testosterone.       Patient presents for follow-up of several urologic issues.  He had previously been followed for low testosterone, ever, he has not been able to follow-up in several months due to insurance change.  He has previously been maintained on testosterone cypionate as well as Testopel.  Symptoms were not well-controlled with these therapies.  He was most recently maintained on Xyosted which provided the best symptom relief. Pretreatment symptoms include increased fatigue, weakness, mood swings, lack of energy, erectile dysfunction.  Obviously, since he has been off therapy for several months, his testosterone levels are quite low.  He was also recently diagnosed with sleep apnea and has a CPAP ordered, but does not typically utilize the machine.    2/14/2025  RBC  4.99  H/H  15.6/44.1%  Testosterone 65.2    Lab Results   Component Value Date    TESTOSTERONE 414.8 10/11/2023    TESTOSTERONE 143.2 (L) 03/20/2023    TESTOSTERONE 171 (L) 10/12/2022        Lab Results   Component Value Date    WBC 8.6 10/11/2023    HGB 16.1 10/11/2023    HCT 49.0 10/11/2023    MCV 92.3 10/11/2023     10/11/2023     Patient also follows for lower urinary tract symptoms with feeling of incomplete emptying, frequency, nocturia.  He is currently maintained on Flomax as well as Toviaz.  He has previously failed oxybutynin.  He reports his symptoms are fairly well controlled with his current therapy, however, he does report irritative symptoms being the most bothersome.     He has also undergone prior work-up for gross hematuria with CT urogram and cystoscopy in October 2022.  Other than a mid pendulous urethral stricture requiring dilation and Izquierdo catheterization after surgery, no significant abnormalities were

## 2025-02-21 ENCOUNTER — TELEPHONE (OUTPATIENT)
Dept: UROLOGY | Age: 40
End: 2025-02-21

## 2025-02-21 NOTE — TELEPHONE ENCOUNTER
Pt's wife, Marcella, is requesting medication   Testosterone Enanthate (XYOSTED) 100 MG/0.5ML SOAJ  be sent to [facility] [address], [facility] [address] PH. [phone].  Pt wants to change due to co-pay cost. Please contact pt if needed. Thank you.

## 2025-02-24 DIAGNOSIS — E29.1 HYPOGONADISM IN MALE: ICD-10-CM

## 2025-02-24 RX ORDER — TESTOSTERONE ENANTHATE 100 MG/.5ML
100 INJECTION SUBCUTANEOUS WEEKLY
Qty: 4 ADJUSTABLE DOSE PRE-FILLED PEN SYRINGE | Refills: 0 | Status: SHIPPED | OUTPATIENT
Start: 2025-02-24

## 2025-03-20 DIAGNOSIS — E29.1 HYPOGONADISM IN MALE: ICD-10-CM

## 2025-03-21 RX ORDER — TESTOSTERONE ENANTHATE 100 MG/.5ML
INJECTION SUBCUTANEOUS
Qty: 4 ML | Refills: 0 | Status: SHIPPED | OUTPATIENT
Start: 2025-03-21

## 2025-06-24 ENCOUNTER — HOSPITAL ENCOUNTER (EMERGENCY)
Facility: HOSPITAL | Age: 40
Discharge: HOME | End: 2025-06-24
Admitting: FAMILY MEDICINE

## 2025-06-24 VITALS
BODY MASS INDEX: 41.31 KG/M2 | WEIGHT: 305 LBS | RESPIRATION RATE: 18 BRPM | HEIGHT: 72 IN | SYSTOLIC BLOOD PRESSURE: 151 MMHG | TEMPERATURE: 97.9 F | DIASTOLIC BLOOD PRESSURE: 90 MMHG | HEART RATE: 79 BPM | OXYGEN SATURATION: 97 %

## 2025-06-24 DIAGNOSIS — T14.8XXA ABRASION: Primary | ICD-10-CM

## 2025-06-24 LAB
ALBUMIN SERPL-MCNC: 3.8 G/DL (ref 3.5–5.2)
ALBUMIN/GLOB SERPL: 1.1 G/DL
ALP SERPL-CCNC: 67 U/L (ref 39–117)
ALT SERPL W P-5'-P-CCNC: 30 U/L (ref 1–41)
ANION GAP SERPL CALCULATED.3IONS-SCNC: 12 MMOL/L (ref 5–15)
AST SERPL-CCNC: 17 U/L (ref 1–40)
BASOPHILS # BLD AUTO: 0.06 10*3/MM3 (ref 0–0.2)
BASOPHILS NFR BLD AUTO: 0.6 % (ref 0–1.5)
BILIRUB SERPL-MCNC: 0.4 MG/DL (ref 0–1.2)
BUN SERPL-MCNC: 11.1 MG/DL (ref 6–20)
BUN/CREAT SERPL: 15.6 (ref 7–25)
CALCIUM SPEC-SCNC: 9.1 MG/DL (ref 8.6–10.5)
CHLORIDE SERPL-SCNC: 101 MMOL/L (ref 98–107)
CO2 SERPL-SCNC: 25 MMOL/L (ref 22–29)
CREAT SERPL-MCNC: 0.71 MG/DL (ref 0.76–1.27)
DEPRECATED RDW RBC AUTO: 40.5 FL (ref 37–54)
EGFRCR SERPLBLD CKD-EPI 2021: 120.4 ML/MIN/1.73
EOSINOPHIL # BLD AUTO: 0.39 10*3/MM3 (ref 0–0.4)
EOSINOPHIL NFR BLD AUTO: 4.1 % (ref 0.3–6.2)
ERYTHROCYTE [DISTWIDTH] IN BLOOD BY AUTOMATED COUNT: 12.9 % (ref 12.3–15.4)
GLOBULIN UR ELPH-MCNC: 3.4 GM/DL
GLUCOSE SERPL-MCNC: 116 MG/DL (ref 65–99)
HCT VFR BLD AUTO: 43.7 % (ref 37.5–51)
HGB BLD-MCNC: 14.9 G/DL (ref 13–17.7)
IMM GRANULOCYTES # BLD AUTO: 0.05 10*3/MM3 (ref 0–0.05)
IMM GRANULOCYTES NFR BLD AUTO: 0.5 % (ref 0–0.5)
LYMPHOCYTES # BLD AUTO: 2.01 10*3/MM3 (ref 0.7–3.1)
LYMPHOCYTES NFR BLD AUTO: 21.2 % (ref 19.6–45.3)
MCH RBC QN AUTO: 30 PG (ref 26.6–33)
MCHC RBC AUTO-ENTMCNC: 34.1 G/DL (ref 31.5–35.7)
MCV RBC AUTO: 87.9 FL (ref 79–97)
MONOCYTES # BLD AUTO: 0.49 10*3/MM3 (ref 0.1–0.9)
MONOCYTES NFR BLD AUTO: 5.2 % (ref 5–12)
NEUTROPHILS NFR BLD AUTO: 6.49 10*3/MM3 (ref 1.7–7)
NEUTROPHILS NFR BLD AUTO: 68.4 % (ref 42.7–76)
NRBC BLD AUTO-RTO: 0 /100 WBC (ref 0–0.2)
NT-PROBNP SERPL-MCNC: 65.8 PG/ML (ref 0–450)
PLATELET # BLD AUTO: 224 10*3/MM3 (ref 140–450)
PMV BLD AUTO: 9.4 FL (ref 6–12)
POTASSIUM SERPL-SCNC: 4 MMOL/L (ref 3.5–5.2)
PROT SERPL-MCNC: 7.2 G/DL (ref 6–8.5)
RBC # BLD AUTO: 4.97 10*6/MM3 (ref 4.14–5.8)
SODIUM SERPL-SCNC: 138 MMOL/L (ref 136–145)
WBC NRBC COR # BLD AUTO: 9.49 10*3/MM3 (ref 3.4–10.8)

## 2025-06-24 PROCEDURE — 80053 COMPREHEN METABOLIC PANEL: CPT | Performed by: NURSE PRACTITIONER

## 2025-06-24 PROCEDURE — 83880 ASSAY OF NATRIURETIC PEPTIDE: CPT | Performed by: NURSE PRACTITIONER

## 2025-06-24 PROCEDURE — 36415 COLL VENOUS BLD VENIPUNCTURE: CPT

## 2025-06-24 PROCEDURE — 85025 COMPLETE CBC W/AUTO DIFF WBC: CPT | Performed by: NURSE PRACTITIONER

## 2025-06-24 PROCEDURE — 99283 EMERGENCY DEPT VISIT LOW MDM: CPT

## 2025-06-24 RX ORDER — GINSENG 100 MG
CAPSULE ORAL 2 TIMES DAILY
Qty: 14 G | Refills: 0 | Status: SHIPPED | OUTPATIENT
Start: 2025-06-24

## 2025-06-24 NOTE — Clinical Note
[facility] EMERGENCY DEPARTMENT  [address] [place] [address] AVE  [facility] [address]  Phone: [phone]    Hong Moreno was seen and treated in our emergency department on 6/24/2025.  He may return to work on 06/25/2025.         Thank you for choosing [facility].    Elizabeth Cardenas, APRN

## 2025-06-24 NOTE — DISCHARGE INSTRUCTIONS
Recommend to just keep clean and dry while working and use protective barrier such as kerlix wrap or 4x4 to prevent further rubbing.    Once you are home and showered and no longer in a hot environment recommend applying ointment before sleeping.

## 2025-06-24 NOTE — ED PROVIDER NOTES
Subjective   History of Present Illness  Patient is a 41 yo male who presents to the er with redness around his legs. He wore tight fitting socks yesterday.  He has worsening swelling to his lower extremities.  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  He states yesterday he wore socks that did not match and his right sock was tighter fitting than his left sock around his legs.  This morning he woke up and noted redness around his calves with discomfort described as burning.  He has noted worsening swelling to his legs bilaterally for the past several weeks.  Due to symptoms described come the ER for evaluation and treatment.  Past medical history significant for BPH, hypogonadism male, sleep apnea        Review of Systems   Constitutional: Negative.    HENT: Negative.     Eyes: Negative.    Respiratory: Negative.     Cardiovascular:  Positive for leg swelling.   Gastrointestinal: Negative.    Endocrine: Negative.    Genitourinary: Negative.    Musculoskeletal: Negative.    Skin:  Positive for rash.   Allergic/Immunologic: Negative.    Neurological: Negative.    Hematological: Negative.    Psychiatric/Behavioral: Negative.     All other systems reviewed and are negative.      Past Medical History:   Diagnosis Date    BPH (benign prostatic hyperplasia)     Hypogonadism in male     Sleep apnea        No Known Allergies    History reviewed. No pertinent surgical history.    History reviewed. No pertinent family history.    Social History     Socioeconomic History    Marital status:            Objective   Physical Exam  Vitals and nursing note reviewed.   Constitutional:       General: He is not in acute distress.     Appearance: Normal appearance. He is well-developed. He is obese. He is not diaphoretic.   HENT:      Head: Atraumatic.      Right Ear: External ear normal.      Left Ear: External ear normal.      Nose: Nose normal.      Mouth/Throat:      Pharynx:  Oropharynx is clear.   Eyes:      General: No scleral icterus.     Conjunctiva/sclera: Conjunctivae normal.   Neck:      Thyroid: No thyromegaly.      Vascular: No JVD.   Cardiovascular:      Rate and Rhythm: Normal rate and regular rhythm.      Heart sounds: Normal heart sounds. No murmur heard.  Pulmonary:      Effort: Pulmonary effort is normal. No respiratory distress.      Breath sounds: Normal breath sounds. No wheezing or rales.   Chest:      Chest wall: No tenderness.   Abdominal:      General: Bowel sounds are normal. There is no distension.      Palpations: Abdomen is soft. There is no mass.      Tenderness: There is no abdominal tenderness. There is no guarding or rebound.   Musculoskeletal:         General: Normal range of motion.      Cervical back: Normal range of motion and neck supple.   Lymphadenopathy:      Cervical: No cervical adenopathy.   Skin:     General: Skin is warm and dry.      Coloration: Skin is not pale.      Findings: Erythema and rash present.      Comments: Mild erythemic rash in a perfect Rincon around calfs bilaterally from location of his socks, no streaking of the skin, warmth, or diffuse erythema noted   Neurological:      Mental Status: He is alert and oriented to person, place, and time.      Cranial Nerves: No cranial nerve deficit.      Coordination: Coordination normal.      Deep Tendon Reflexes: Reflexes are normal and symmetric.   Psychiatric:         Mood and Affect: Mood normal.         Behavior: Behavior normal.         Thought Content: Thought content normal.         Judgment: Judgment normal.         Procedures           ED Course                                                       Medical Decision Making  Patient is a 41 yo male who presents to the er with redness around his legs. He wore tight fitting socks yesterday.  He has worsening swelling to his lower extremities.  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  He states  yesterday he wore socks that did not match and his right sock was tighter fitting than his left sock around his legs.  This morning he woke up and noted redness around his calves with discomfort described as burning.  He has noted worsening swelling to his legs bilaterally for the past several weeks.  Due to symptoms described come the ER for evaluation and treatment.  Past medical history significant for BPH, hypogonadism male, sleep apnea    Differential diagnosis includes but not limited to abrasion, cellulitis, heat rash, and other etiologies    Problems Addressed:  Abrasion: complicated acute illness or injury    Amount and/or Complexity of Data Reviewed  Labs: ordered.    Risk  OTC drugs.      Labs Reviewed   COMPREHENSIVE METABOLIC PANEL - Abnormal; Notable for the following components:       Result Value    Glucose 116 (*)     Creatinine 0.71 (*)     All other components within normal limits    Narrative:     GFR Categories in Chronic Kidney Disease (CKD)              GFR Category          GFR (mL/min/1.73)    Interpretation  G1                    90 or greater        Normal or high (1)  G2                    60-89                Mild decrease (1)  G3a                   45-59                Mild to moderate decrease  G3b                   30-44                Moderate to severe decrease  G4                    15-29                Severe decrease  G5                    14 or less           Kidney failure    (1)In the absence of evidence of kidney disease, neither GFR category G1 or G2 fulfill the criteria for CKD.    eGFR calculation 2021 CKD-EPI creatinine equation, which does not include race as a factor   BNP (IN-HOUSE) - Normal    Narrative:     This assay is used as an aid in the diagnosis of individuals suspected of having heart failure. It can be used as an aid in the diagnosis of acute decompensated heart failure (ADHF) in patients presenting with signs and symptoms of ADHF to the emergency department  (ED). In addition, NT-proBNP of <300 pg/mL indicates ADHF is not likely.    Age Range Result Interpretation  NT-proBNP Concentration (pg/mL:      <50             Positive            >450                   Gray                 300-450                    Negative             <300    50-75           Positive            >900                  Gray                300-900                  Negative            <300      >75             Positive            >1800                  Gray                300-1800                  Negative            <300   CBC WITH AUTO DIFFERENTIAL - Normal   CBC AND DIFFERENTIAL    Narrative:     The following orders were created for panel order CBC & Differential.  Procedure                               Abnormality         Status                     ---------                               -----------         ------                     CBC Auto Differential[[id number]]         Normal              Final result                 Please view results for these tests on the individual orders.   Labs are unremarkable.  Patient has what appears to be a perfect red ring around his calves bilaterally from rubbing of his socks that were too tight.  Patient is overweight and works in an environment where he got hot and he has what appears to be a rubbing rash around his legs from his socks.  Recommend to prevent any rubbing by wearing socks that are less tight fitting.  Recommend 4 x 4's and Kerlix around the areas to prevent any further rubbing.  He needs to keep the area clean and dry.  Once he is home and able to shower and cooled off he may apply bacitracin to the areas and keep open to air while sleeping.  He will be discharged home shortly in stable condition.  Final diagnoses:   Abrasion       ED Disposition  ED Disposition       ED Disposition   Discharge    Condition   Good    Comment   --               No follow-up provider specified.       Medication List        New Prescriptions      bacitracin 500  UNIT/GM ointment  Apply  topically to the appropriate area as directed 2 (Two) Times a Day.               Where to Get Your Medications        These medications were sent to [facility] - [address] [phone] [facility] [phone]   [address] [address]      Phone: [phone]   bacitracin 500 UNIT/GM ointment            Elizabeth Cardenas, APRN  06/24/25 1212

## 2025-06-24 NOTE — Clinical Note
[facility] EMERGENCY DEPARTMENT  [address] [place] [address] AVE  [facility] [address]  Phone: [phone]    significant other hong loving accompanied Hong Loving to the emergency department on 6/24/2025. They may return to work on 06/25/2025.        Thank you for choosing [facility].    Elizabeth Cardenas, APRN

## (undated) DEVICE — PAD,EYE,1-5/8X2 5/8,STERILE,LF,1/PK: Brand: MEDLINE

## (undated) DEVICE — GUIDEWIRE ENDOSCP L150CM DIA0.035IN TIP 3CM PTFE NIT

## (undated) DEVICE — STERILE LATEX POWDER FREE SURGICAL GLOVES WITH HYDROGEL COATING: Brand: PROTEXIS

## (undated) DEVICE — CATHETER URET 5FR L70CM OPN END SGL LUMN INJ HUB FLEXIMA

## (undated) DEVICE — BAG,DRAINAGE,4L,A/R TOWER,LL,SLIDE TAP: Brand: MEDLINE

## (undated) DEVICE — SEAL ENDO INSTR SELF SEAL UROLOGY

## (undated) DEVICE — Device: Brand: LEVEL 1

## (undated) DEVICE — SOLUTION IRRIG 3000ML 0.9% SOD CHL USP UROMATIC PLAS CONT

## (undated) DEVICE — BAG DRNGE COMB PK

## (undated) DEVICE — SURGICAL PROCEDURE PACK CYTOSCOPY

## (undated) DEVICE — 1621G LTX CATH 5CC 2-WAY 20FR: Brand: DOVER

## (undated) DEVICE — Device

## (undated) DEVICE — GLOVE SURG SZ 75 CRM LTX FREE POLYISOPRENE POLYMER BEAD ANTI

## (undated) DEVICE — GUIDEWIRE VASC L145CM DIA0.035IN TIP L4CM PTFE S STL SHT